# Patient Record
Sex: FEMALE | Race: WHITE | NOT HISPANIC OR LATINO | Employment: OTHER | URBAN - METROPOLITAN AREA
[De-identification: names, ages, dates, MRNs, and addresses within clinical notes are randomized per-mention and may not be internally consistent; named-entity substitution may affect disease eponyms.]

---

## 2018-07-30 ENCOUNTER — PREP FOR SURGERY (OUTPATIENT)
Dept: OTHER | Facility: HOSPITAL | Age: 78
End: 2018-07-30

## 2018-07-30 DIAGNOSIS — S82.891A CLOSED FRACTURE OF RIGHT ANKLE, INITIAL ENCOUNTER: ICD-10-CM

## 2018-07-30 DIAGNOSIS — E11.9 TYPE 2 DIABETES MELLITUS WITHOUT COMPLICATION, WITHOUT LONG-TERM CURRENT USE OF INSULIN (HCC): Primary | ICD-10-CM

## 2018-07-30 RX ORDER — CEFAZOLIN SODIUM 2 G/100ML
2 INJECTION, SOLUTION INTRAVENOUS ONCE
Status: CANCELLED | OUTPATIENT
Start: 2018-07-30 | End: 2018-07-30

## 2018-08-06 PROBLEM — S82.891A CLOSED FRACTURE OF RIGHT ANKLE: Status: ACTIVE | Noted: 2018-08-06

## 2018-08-06 PROBLEM — E11.9 TYPE 2 DIABETES MELLITUS WITHOUT COMPLICATION, WITHOUT LONG-TERM CURRENT USE OF INSULIN (HCC): Status: ACTIVE | Noted: 2018-08-06

## 2018-08-07 ENCOUNTER — OFFICE VISIT (OUTPATIENT)
Dept: ORTHOPEDIC SURGERY | Facility: CLINIC | Age: 78
End: 2018-08-07

## 2018-08-07 ENCOUNTER — APPOINTMENT (OUTPATIENT)
Dept: PREADMISSION TESTING | Facility: HOSPITAL | Age: 78
End: 2018-08-07

## 2018-08-07 VITALS — BODY MASS INDEX: 23.66 KG/M2 | HEIGHT: 65 IN | OXYGEN SATURATION: 99 % | HEART RATE: 82 BPM | WEIGHT: 142 LBS

## 2018-08-07 VITALS — BODY MASS INDEX: 23.66 KG/M2 | HEIGHT: 65 IN | WEIGHT: 142 LBS

## 2018-08-07 DIAGNOSIS — E11.9 TYPE 2 DIABETES MELLITUS WITHOUT COMPLICATION, WITHOUT LONG-TERM CURRENT USE OF INSULIN (HCC): ICD-10-CM

## 2018-08-07 DIAGNOSIS — S82.891A CLOSED FRACTURE OF RIGHT ANKLE, INITIAL ENCOUNTER: ICD-10-CM

## 2018-08-07 DIAGNOSIS — R52 PAIN: Primary | ICD-10-CM

## 2018-08-07 DIAGNOSIS — I82.5Z1 CHRONIC DEEP VEIN THROMBOSIS (DVT) OF DISTAL VEIN OF RIGHT LOWER EXTREMITY (HCC): ICD-10-CM

## 2018-08-07 LAB
ANION GAP SERPL CALCULATED.3IONS-SCNC: 9 MMOL/L (ref 3–11)
BASOPHILS # BLD AUTO: 0.01 10*3/MM3 (ref 0–0.2)
BASOPHILS NFR BLD AUTO: 0.2 % (ref 0–1)
BUN BLD-MCNC: 20 MG/DL (ref 9–23)
BUN/CREAT SERPL: 21.5 (ref 7–25)
CALCIUM SPEC-SCNC: 9.5 MG/DL (ref 8.7–10.4)
CHLORIDE SERPL-SCNC: 107 MMOL/L (ref 99–109)
CO2 SERPL-SCNC: 25 MMOL/L (ref 20–31)
CREAT BLD-MCNC: 0.93 MG/DL (ref 0.6–1.3)
DEPRECATED RDW RBC AUTO: 44.2 FL (ref 37–54)
EOSINOPHIL # BLD AUTO: 0.03 10*3/MM3 (ref 0–0.3)
EOSINOPHIL NFR BLD AUTO: 0.6 % (ref 0–3)
ERYTHROCYTE [DISTWIDTH] IN BLOOD BY AUTOMATED COUNT: 13.4 % (ref 11.3–14.5)
GFR SERPL CREATININE-BSD FRML MDRD: 58 ML/MIN/1.73
GLUCOSE BLD-MCNC: 131 MG/DL (ref 70–100)
HBA1C MFR BLD: 5.8 % (ref 4.8–5.6)
HCT VFR BLD AUTO: 34.9 % (ref 34.5–44)
HGB BLD-MCNC: 10.9 G/DL (ref 11.5–15.5)
IMM GRANULOCYTES # BLD: 0 10*3/MM3 (ref 0–0.03)
IMM GRANULOCYTES NFR BLD: 0 % (ref 0–0.6)
LYMPHOCYTES # BLD AUTO: 0.57 10*3/MM3 (ref 0.6–4.8)
LYMPHOCYTES NFR BLD AUTO: 11.7 % (ref 24–44)
MCH RBC QN AUTO: 28.5 PG (ref 27–31)
MCHC RBC AUTO-ENTMCNC: 31.2 G/DL (ref 32–36)
MCV RBC AUTO: 91.4 FL (ref 80–99)
MONOCYTES # BLD AUTO: 0.33 10*3/MM3 (ref 0–1)
MONOCYTES NFR BLD AUTO: 6.8 % (ref 0–12)
NEUTROPHILS # BLD AUTO: 3.94 10*3/MM3 (ref 1.5–8.3)
NEUTROPHILS NFR BLD AUTO: 80.7 % (ref 41–71)
PLATELET # BLD AUTO: 209 10*3/MM3 (ref 150–450)
PMV BLD AUTO: 10 FL (ref 6–12)
POTASSIUM BLD-SCNC: 4.4 MMOL/L (ref 3.5–5.5)
RBC # BLD AUTO: 3.82 10*6/MM3 (ref 3.89–5.14)
SODIUM BLD-SCNC: 141 MMOL/L (ref 132–146)
WBC NRBC COR # BLD: 4.88 10*3/MM3 (ref 3.5–10.8)

## 2018-08-07 PROCEDURE — 83036 HEMOGLOBIN GLYCOSYLATED A1C: CPT | Performed by: ORTHOPAEDIC SURGERY

## 2018-08-07 PROCEDURE — 80048 BASIC METABOLIC PNL TOTAL CA: CPT | Performed by: ORTHOPAEDIC SURGERY

## 2018-08-07 PROCEDURE — 85025 COMPLETE CBC W/AUTO DIFF WBC: CPT | Performed by: ORTHOPAEDIC SURGERY

## 2018-08-07 PROCEDURE — 36415 COLL VENOUS BLD VENIPUNCTURE: CPT

## 2018-08-07 PROCEDURE — 99204 OFFICE O/P NEW MOD 45 MIN: CPT | Performed by: ORTHOPAEDIC SURGERY

## 2018-08-07 RX ORDER — HYDROCODONE BITARTRATE AND ACETAMINOPHEN 7.5; 325 MG/1; MG/1
1-2 TABLET ORAL EVERY 6 HOURS PRN
Qty: 60 TABLET | Refills: 0 | Status: SHIPPED | OUTPATIENT
Start: 2018-08-07

## 2018-08-07 RX ORDER — ASPIRIN 325 MG
TABLET ORAL
COMMUNITY
Start: 2008-10-29

## 2018-08-07 RX ORDER — ASPIRIN 325 MG
325 TABLET ORAL DAILY
Status: ON HOLD | COMMUNITY
End: 2018-08-09

## 2018-08-07 RX ORDER — RIVAROXABAN 20 MG/1
TABLET, FILM COATED ORAL
Status: ON HOLD | COMMUNITY
End: 2018-08-09

## 2018-08-07 RX ORDER — ROSUVASTATIN CALCIUM 5 MG/1
5 TABLET, COATED ORAL DAILY
COMMUNITY

## 2018-08-07 RX ORDER — TOPIRAMATE 25 MG/1
50 CAPSULE, COATED PELLETS ORAL 2 TIMES DAILY
Status: ON HOLD | COMMUNITY
End: 2018-08-09

## 2018-08-07 RX ORDER — ROSUVASTATIN CALCIUM 5 MG/1
TABLET, COATED ORAL
Status: ON HOLD | COMMUNITY
End: 2018-08-09

## 2018-08-07 RX ORDER — TOPIRAMATE 25 MG
TABLET ORAL
COMMUNITY

## 2018-08-07 RX ORDER — CITALOPRAM 20 MG/1
TABLET ORAL
COMMUNITY

## 2018-08-07 NOTE — PAT
PATIENT HAS ABNORMAL EKG. PATIENT WAS SEEN BY DR. MOLINA'S PA 7- AND WAS FOUND TO BE STABLE ON CURRENT TREATMENT. DR. GRACE NOTIFIED. OK TO PROCEED.

## 2018-08-07 NOTE — PROGRESS NOTES
NEW PATIENT    Patient: Anaid Cao  : 1940    Primary Care Provider: Geetha Cabrera MD    Requesting Provider: As above    Pain of the Right Ankle (ORIF Rt. Ankle sx 18 )      History    Chief Complaint: right ankle fracture    History of Present Illness: this is an extremely pleasant 78 year old woman here with her sister and son-in-law.  She has a right ankle trimalleolar ankle fracture when she fell off her bed while doing something to the ceiling fan.  18 was the date.  She was seen at [facility], splinted and referred to Dr Redmond.  She is here due to a family friend recommendation.  She has been elevating, been in significant pain.  Her medical history is complex, she has diabetes (good control, most recent reported HGA1c was 6--) and lymphoma complicated by DVT.  We called Dr Cabrera's office last week to find out if her anticoagulant can be stopped, whether she needed a Lovenox bridge.  They advised that the Xarelto can be stopped 2 days pre op, no bridge needed.      No current outpatient prescriptions on file prior to visit.     No current facility-administered medications on file prior to visit.       Allergies   Allergen Reactions   • Levofloxacin Hives   • Oxycodone-Acetaminophen Itching   • Sulfa Antibiotics Hives      Past Medical History:   Diagnosis Date   • Ankle fracture    • Coronary artery disease    • Diabetes (CMS/HCC)     DIAGNOSED APPROX 5 YEARS AGO. TYPE 2 TESTS DAILY   • Lymphoma (CMS/HCC)    • Migraines      Past Surgical History:   Procedure Laterality Date   • ANKLE OPEN REDUCTION INTERNAL FIXATION Right 2018    Rt. Ankle ORIF - Dr. Ashely Saba; Orthopedic Surgery    • CORONARY ARTERY BYPASS GRAFT       Family History   Problem Relation Age of Onset   • Cancer Mother    • Diabetes Mother       Social History     Social History   • Marital status: Unknown     Spouse name: N/A   • Number of children: N/A   • Years of education: N/A     Occupational History   •  "Not on file.     Social History Main Topics   • Smoking status: Never Smoker   • Smokeless tobacco: Never Used   • Alcohol use No   • Drug use: No   • Sexual activity: Defer     Other Topics Concern   • Not on file     Social History Narrative   • No narrative on file        Review of Systems   Constitutional: Negative.    HENT: Negative.    Eyes: Negative.    Respiratory: Negative.    Cardiovascular: Positive for leg swelling.   Gastrointestinal: Negative.    Endocrine: Negative.    Genitourinary: Negative.    Musculoskeletal: Positive for arthralgias, gait problem, joint swelling and myalgias.   Skin: Positive for color change.   Allergic/Immunologic: Negative.    Hematological: Negative.    Psychiatric/Behavioral: Negative.    All other systems reviewed and are negative.      The following portions of the patient's history were reviewed and updated as appropriate: allergies, current medications, past family history, past medical history, past social history, past surgical history and problem list.    Physical Exam:   Pulse 82   Ht 165.1 cm (65\")   Wt 64.4 kg (142 lb)   SpO2 99%   BMI 23.63 kg/m²   GENERAL: Body habitus: normal weight for height    Lower extremity edema: Left: none; Right: trace    Varicose veins:  Left: mild; Right: mild    Gait: in wheelchair     Mental Status:  awake and alert; oriented to person, place, and time    Voice:  clear  SKIN:  warm and dry    Hair Growth:  Right:diminished; Left:  diminished  NAILS: Toenails: normal  HEENT: Head: Normocephalic, atraumatic,  without obvious abnormality.  eye: normal external eye, no icterus  ears: normal external ears  nose: normal external nose  pharynx: dental hygiene adequate  PULM:  Repiratory effort normal  CV:  Dorsalis Pedis:  Right: 2+; Left:2+    Posterior Tibial: Right:2+; Left:2+    Capillary Refill:  Brisk  MSK:  Hand:right handed      Tibia:  Right:  non tender; Left:  non tender      Ankle:  Right: very tender medial and lateral " ankle, mild swelling, moderate ecchymosis, no fracture bilsters, no sign of compartment syndrome.  not tender in foot except 1cm area of skin necrosis from tight ace wrap from ER, no sign of infection, not loose, no drainage,.    moderate bunion and hammertoes, some pressure from splint over bunion, but no necrosis; Left:  non tender            NEURO: Heel Walking:  Right:  unable to test; Left:  unable to test    Toe Walking:  Right:  unable to test; Left:  unable to test     Millburn-Sally 5.07 monofilament test: patchy decrease    Lower extremity sensation: diminished        Calf Atrophy:none    Motor Function: all motors fire on right, give-way weakness due to pain, no sign of compartment syndrome         Medical Decision Making    Data Review:   ordered and reviewed x-rays today, reviewed radiology results and reviewed outside records    Assessment and Plan/ Diagnosis/Treatment options:   1.. Closed fracture of right ankle, initial encounter  She has a trimalleolar ankle fracture on the right.  It was not well reduced, but at least there was no skin compromise over the fractures.  I gently tried to improve reduction in the office, and we placed a more comfortable splint, (short leg fiberglass, non-wt bearing) with more padding and no pressure on the small area of necrosis over the dorsal foot (from tight ace wrap from ER).  I had a long discussion with patient and her sister.  I explained fractures of joints to the patient.  I explained that all joint fractures will develop some arthritis. The goal of treatment is to put the joint back in  place as anatomically as possible, and hold it there to heal.  I explained that fractures have stable and unstable patterns.  I think this fracture is too unstable to hold in a cast, I think the risk of malunion and non union are too high if we use cast treatment.  (it would need to be a long leg cast)  I would recommend surgery.   I explained the surgery, the 23 hour  admission.  I explained the incisions, the plate and screws.  I would assume the hardware is permanent, rarely it is bothersome and can be removed.  I explained the possibility of syndesmotic fixation , and the possible need to remove that (I will not know if this is necessary until we are in surgery). the post op regimen is 12 weeks non wt bearing in a short leg cast then 6-8 weeks walking in a boot doing physical therapy. .  I explained the posterior malleolar fracture is not large enough to need fixation.  I explained how all ankle fractures swell for months to years, some permanently.   I explained the risks including but not limited to death, infection, stroke, heart attacks, blood clots, neurovascular damage, stiffness, pain, arthritis, hardware failure, non union, malunion, amputation, etc. I explained that she is at increased risk for all complications given her medical history (neuropathy, DM, DVT, age, lymphoma).  The research literature shows that ankle fractures in this setting have a 50% risk of complication with operative OR non-operative treatment.  However, I do not think this can be treated non-operatively, it is too unstable.  I also explained that the area of necrosis will take a long time to heal.  I also explained  That she will have some permanent swelling, may lose ROM.   Questions were asked and answered in detail. Plan for surgery Thursday, 8/9      2. Type 2 diabetes mellitus without complication, without long-term current use of insulin (CMS/HCC)  She does have neuropathy on exam    3. Chronic deep vein thrombosis (DVT) of distal vein of right lower extremity (CMS/HCC)  As above, we will stop Xarelto today, renew post op

## 2018-08-07 NOTE — DISCHARGE INSTRUCTIONS

## 2018-08-07 NOTE — PROGRESS NOTES
"        [facility] Orthopaedic Surgery Clinic Note    Subjective     Patient: Anaid Cao  : 1940    Primary Care Provider: Geetha Cabrera MD    Requesting Provider: As above    Pain of the Right Ankle (ORIF Rt. Ankle sx 18 )      History    Chief Complaint: ***    History of Present Illness: ***    No current outpatient prescriptions on file prior to visit.     No current facility-administered medications on file prior to visit.       Allergies   Allergen Reactions   • Levofloxacin Hives   • Oxycodone-Acetaminophen Itching   • Sulfa Antibiotics Hives      Past Medical History:   Diagnosis Date   • Diabetes (CMS/HCC)    • Osteoarthritis      Past Surgical History:   Procedure Laterality Date   • ANKLE OPEN REDUCTION INTERNAL FIXATION Right 2018    Rt. Ankle ORIF - Dr. Ashely Saba; Orthopedic Surgery      Family History   Problem Relation Age of Onset   • Cancer Mother    • Diabetes Mother       Social History     Social History   • Marital status: Unknown     Spouse name: N/A   • Number of children: N/A   • Years of education: N/A     Occupational History   • Not on file.     Social History Main Topics   • Smoking status: Never Smoker   • Smokeless tobacco: Never Used   • Alcohol use No   • Drug use: No   • Sexual activity: Defer     Other Topics Concern   • Not on file     Social History Narrative   • No narrative on file        Review of Systems   Constitutional: Negative.    HENT: Negative.    Eyes: Negative.    Respiratory: Negative.    Cardiovascular: Negative.    Gastrointestinal: Negative.    Endocrine: Negative.    Genitourinary: Negative.    Musculoskeletal: Positive for joint swelling.   Skin: Negative.    Allergic/Immunologic: Negative.    Neurological: Negative.    Hematological: Negative.    Psychiatric/Behavioral: Negative.        The following portions of the patient's history were reviewed and updated as appropriate: {history reviewed:::\"allergies\",\"current " "medications\",\"past family history\",\"past medical history\",\"past social history\",\"past surgical history\",\"problem list\"}.      Objective      Physical Exam  Pulse 82   Ht 165.1 cm (65\")   Wt 64.4 kg (142 lb)   SpO2 99%   BMI 23.63 kg/m²     Body mass index is 23.63 kg/m².    GENERAL: Body habitus: {body habitus:00443}    Lower extremity edema: Left: {ltdedema:70246::\"none\"}; Right: {ltdedema:31634::\"none\"}    Varicose veins:  Left: {ltdveins:82861::\"none\"}; Right: {ltdveins:88942::\"none\"}    Gait: {ltdgait:59042::\"normal\"}     Mental Status:  {mental status:61951}    Voice:  {Voice quality:05418}  SKIN:  {integument:54998::\"Normal\"}    Hair Growth:  Right:{hair growth:99728::\"normal\"}; Left:  {hair growth:04574::\"normal\"}  HEENT: {Exam; heent:93339}   PULM:  {pulm:87616::\"Repiratory effort normal\"}    Ortho Exam  ***    Medical Decision Making    Data Review:   {Data Review:26992}    Assessment:  1. Pain        Plan:  ***      Jennifer Arteaga MA  08/07/18  10:32 AM  "

## 2018-08-08 RX ORDER — HYDROCODONE BITARTRATE AND ACETAMINOPHEN 7.5; 325 MG/1; MG/1
1-2 TABLET ORAL EVERY 6 HOURS PRN
Qty: 60 TABLET | Refills: 0 | Status: ON HOLD | OUTPATIENT
Start: 2018-08-08 | End: 2018-08-09

## 2018-08-08 RX ORDER — ONDANSETRON 4 MG/1
4 TABLET, FILM COATED ORAL EVERY 6 HOURS PRN
Qty: 30 TABLET | Refills: 0 | Status: SHIPPED | OUTPATIENT
Start: 2018-08-08

## 2018-08-09 ENCOUNTER — HOSPITAL ENCOUNTER (OUTPATIENT)
Facility: HOSPITAL | Age: 78
Discharge: HOME | End: 2018-08-10
Attending: ORTHOPAEDIC SURGERY | Admitting: ANESTHESIOLOGY

## 2018-08-09 ENCOUNTER — APPOINTMENT (OUTPATIENT)
Dept: GENERAL RADIOLOGY | Facility: HOSPITAL | Age: 78
End: 2018-08-09

## 2018-08-09 ENCOUNTER — ANESTHESIA (OUTPATIENT)
Dept: PERIOP | Facility: HOSPITAL | Age: 78
End: 2018-08-09

## 2018-08-09 ENCOUNTER — ANESTHESIA EVENT (OUTPATIENT)
Dept: PERIOP | Facility: HOSPITAL | Age: 78
End: 2018-08-09

## 2018-08-09 DIAGNOSIS — Z74.09 IMPAIRED FUNCTIONAL MOBILITY, BALANCE, GAIT, AND ENDURANCE: Primary | ICD-10-CM

## 2018-08-09 DIAGNOSIS — E11.9 TYPE 2 DIABETES MELLITUS WITHOUT COMPLICATION, WITHOUT LONG-TERM CURRENT USE OF INSULIN (HCC): ICD-10-CM

## 2018-08-09 DIAGNOSIS — S82.891A CLOSED FRACTURE OF RIGHT ANKLE, INITIAL ENCOUNTER: ICD-10-CM

## 2018-08-09 PROBLEM — Z87.81 S/P ORIF (OPEN REDUCTION INTERNAL FIXATION) FRACTURE: Status: ACTIVE | Noted: 2018-08-09

## 2018-08-09 PROBLEM — Z98.890 S/P ORIF (OPEN REDUCTION INTERNAL FIXATION) FRACTURE: Status: ACTIVE | Noted: 2018-08-09

## 2018-08-09 PROBLEM — I25.10 CAD (CORONARY ARTERY DISEASE): Status: ACTIVE | Noted: 2018-08-09

## 2018-08-09 PROBLEM — S82.841A ANKLE FRACTURE, BIMALLEOLAR, CLOSED, RIGHT, INITIAL ENCOUNTER: Status: ACTIVE | Noted: 2018-08-09

## 2018-08-09 PROBLEM — E78.5 HLD (HYPERLIPIDEMIA): Status: ACTIVE | Noted: 2018-08-09

## 2018-08-09 LAB
GLUCOSE BLDC GLUCOMTR-MCNC: 127 MG/DL (ref 70–130)
GLUCOSE BLDC GLUCOMTR-MCNC: 148 MG/DL (ref 70–130)
GLUCOSE BLDC GLUCOMTR-MCNC: 190 MG/DL (ref 70–130)

## 2018-08-09 PROCEDURE — 25010000003 CEFAZOLIN IN DEXTROSE 2-4 GM/100ML-% SOLUTION: Performed by: ORTHOPAEDIC SURGERY

## 2018-08-09 PROCEDURE — 25010000002 DEXAMETHASONE PER 1 MG: Performed by: ANESTHESIOLOGY

## 2018-08-09 PROCEDURE — 25010000002 ONDANSETRON PER 1 MG: Performed by: ORTHOPAEDIC SURGERY

## 2018-08-09 PROCEDURE — 25010000002 ROPIVACAINE HCL-NACL 0.2-0.9 % SOLUTION: Performed by: ANESTHESIOLOGY

## 2018-08-09 PROCEDURE — 25010000002 FENTANYL CITRATE (PF) 100 MCG/2ML SOLUTION: Performed by: ANESTHESIOLOGY

## 2018-08-09 PROCEDURE — 25010000002 MIDAZOLAM PER 1 MG: Performed by: ANESTHESIOLOGY

## 2018-08-09 PROCEDURE — 25010000002 DEXAMETHASONE PER 1 MG: Performed by: NURSE ANESTHETIST, CERTIFIED REGISTERED

## 2018-08-09 PROCEDURE — C1713 ANCHOR/SCREW BN/BN,TIS/BN: HCPCS | Performed by: ORTHOPAEDIC SURGERY

## 2018-08-09 PROCEDURE — 97161 PT EVAL LOW COMPLEX 20 MIN: CPT

## 2018-08-09 PROCEDURE — 27829 TREAT LOWER LEG JOINT: CPT | Performed by: ORTHOPAEDIC SURGERY

## 2018-08-09 PROCEDURE — 63710000001 INSULIN LISPRO (HUMAN) PER 5 UNITS: Performed by: NURSE PRACTITIONER

## 2018-08-09 PROCEDURE — 76000 FLUOROSCOPY <1 HR PHYS/QHP: CPT

## 2018-08-09 PROCEDURE — 25010000002 PROPOFOL 10 MG/ML EMULSION: Performed by: NURSE ANESTHETIST, CERTIFIED REGISTERED

## 2018-08-09 PROCEDURE — 25010000002 BUPRENORPHINE PER 0.1 MG: Performed by: ANESTHESIOLOGY

## 2018-08-09 PROCEDURE — 25010000002 FENTANYL CITRATE (PF) 100 MCG/2ML SOLUTION: Performed by: NURSE ANESTHETIST, CERTIFIED REGISTERED

## 2018-08-09 PROCEDURE — 97530 THERAPEUTIC ACTIVITIES: CPT

## 2018-08-09 PROCEDURE — 25810000003 POTASSIUM CHLORIDE PER 2 MEQ: Performed by: ORTHOPAEDIC SURGERY

## 2018-08-09 PROCEDURE — 82962 GLUCOSE BLOOD TEST: CPT

## 2018-08-09 PROCEDURE — C1769 GUIDE WIRE: HCPCS | Performed by: ORTHOPAEDIC SURGERY

## 2018-08-09 PROCEDURE — 27822 TREATMENT OF ANKLE FRACTURE: CPT | Performed by: ORTHOPAEDIC SURGERY

## 2018-08-09 DEVICE — SCRW CORT S/TAP 3.5X18MM: Type: IMPLANTABLE DEVICE | Site: ANKLE | Status: FUNCTIONAL

## 2018-08-09 DEVICE — SCRW CANN SHRT THRD 1/3 4X46MM: Type: IMPLANTABLE DEVICE | Site: ANKLE | Status: FUNCTIONAL

## 2018-08-09 DEVICE — PLT TBG 1/3 LCP W COL 7HL 81MM: Type: IMPLANTABLE DEVICE | Site: ANKLE | Status: FUNCTIONAL

## 2018-08-09 DEVICE — SCRW CANN SHRT THRD 1/3 4X42MM: Type: IMPLANTABLE DEVICE | Site: ANKLE | Status: FUNCTIONAL

## 2018-08-09 DEVICE — SCRW CORT S/TAP 3.5X44MM: Type: IMPLANTABLE DEVICE | Site: ANKLE | Status: FUNCTIONAL

## 2018-08-09 DEVICE — SCRW CANC FUL/THRD 4.0X16MM: Type: IMPLANTABLE DEVICE | Site: ANKLE | Status: FUNCTIONAL

## 2018-08-09 DEVICE — SCRW CANC FUL/THRD 4.0X14MM: Type: IMPLANTABLE DEVICE | Site: ANKLE | Status: FUNCTIONAL

## 2018-08-09 DEVICE — SCRW CORT S/TAP STRDRV 2.4X14MM: Type: IMPLANTABLE DEVICE | Site: ANKLE | Status: FUNCTIONAL

## 2018-08-09 DEVICE — SCRW CORT S/TAP 3.5X14MM: Type: IMPLANTABLE DEVICE | Site: ANKLE | Status: FUNCTIONAL

## 2018-08-09 RX ORDER — LIDOCAINE HYDROCHLORIDE 10 MG/ML
INJECTION, SOLUTION EPIDURAL; INFILTRATION; INTRACAUDAL; PERINEURAL AS NEEDED
Status: DISCONTINUED | OUTPATIENT
Start: 2018-08-09 | End: 2018-08-09 | Stop reason: SURG

## 2018-08-09 RX ORDER — DEXAMETHASONE SODIUM PHOSPHATE 10 MG/ML
INJECTION INTRAMUSCULAR; INTRAVENOUS AS NEEDED
Status: DISCONTINUED | OUTPATIENT
Start: 2018-08-09 | End: 2018-08-09 | Stop reason: SURG

## 2018-08-09 RX ORDER — FENTANYL CITRATE 50 UG/ML
50 INJECTION, SOLUTION INTRAMUSCULAR; INTRAVENOUS
Status: DISCONTINUED | OUTPATIENT
Start: 2018-08-09 | End: 2018-08-09 | Stop reason: HOSPADM

## 2018-08-09 RX ORDER — HYDROCODONE BITARTRATE AND ACETAMINOPHEN 7.5; 325 MG/1; MG/1
1 TABLET ORAL EVERY 4 HOURS PRN
Status: DISCONTINUED | OUTPATIENT
Start: 2018-08-09 | End: 2018-08-10 | Stop reason: HOSPADM

## 2018-08-09 RX ORDER — DIPHENHYDRAMINE HYDROCHLORIDE 50 MG/ML
25 INJECTION INTRAMUSCULAR; INTRAVENOUS NIGHTLY PRN
Status: DISCONTINUED | OUTPATIENT
Start: 2018-08-09 | End: 2018-08-10 | Stop reason: HOSPADM

## 2018-08-09 RX ORDER — ROPIVACAINE IN 0.9% SOD CHL/PF 0.2% 545ML
6 ELASTOMERIC PUMP, HI VARIABLE RATE INJECTION CONTINUOUS
Status: DISCONTINUED | OUTPATIENT
Start: 2018-08-09 | End: 2018-08-10 | Stop reason: HOSPADM

## 2018-08-09 RX ORDER — LABETALOL HYDROCHLORIDE 5 MG/ML
5 INJECTION, SOLUTION INTRAVENOUS
Status: DISCONTINUED | OUTPATIENT
Start: 2018-08-09 | End: 2018-08-09 | Stop reason: HOSPADM

## 2018-08-09 RX ORDER — BUPRENORPHINE HYDROCHLORIDE 0.32 MG/ML
INJECTION INTRAMUSCULAR; INTRAVENOUS AS NEEDED
Status: DISCONTINUED | OUTPATIENT
Start: 2018-08-09 | End: 2018-08-09 | Stop reason: SURG

## 2018-08-09 RX ORDER — ROSUVASTATIN CALCIUM 10 MG/1
5 TABLET, COATED ORAL DAILY
Status: DISCONTINUED | OUTPATIENT
Start: 2018-08-09 | End: 2018-08-10

## 2018-08-09 RX ORDER — NICOTINE POLACRILEX 4 MG
15 LOZENGE BUCCAL
Status: DISCONTINUED | OUTPATIENT
Start: 2018-08-09 | End: 2018-08-10 | Stop reason: HOSPADM

## 2018-08-09 RX ORDER — DIPHENHYDRAMINE HCL 25 MG
25 CAPSULE ORAL NIGHTLY PRN
Status: DISCONTINUED | OUTPATIENT
Start: 2018-08-09 | End: 2018-08-10 | Stop reason: HOSPADM

## 2018-08-09 RX ORDER — NALOXONE HCL 0.4 MG/ML
0.4 VIAL (ML) INJECTION
Status: DISCONTINUED | OUTPATIENT
Start: 2018-08-09 | End: 2018-08-10 | Stop reason: HOSPADM

## 2018-08-09 RX ORDER — ONDANSETRON 2 MG/ML
4 INJECTION INTRAMUSCULAR; INTRAVENOUS ONCE AS NEEDED
Status: DISCONTINUED | OUTPATIENT
Start: 2018-08-09 | End: 2018-08-09 | Stop reason: HOSPADM

## 2018-08-09 RX ORDER — LABETALOL HYDROCHLORIDE 5 MG/ML
10 INJECTION, SOLUTION INTRAVENOUS EVERY 4 HOURS PRN
Status: DISCONTINUED | OUTPATIENT
Start: 2018-08-09 | End: 2018-08-10 | Stop reason: HOSPADM

## 2018-08-09 RX ORDER — FENTANYL CITRATE 50 UG/ML
INJECTION, SOLUTION INTRAMUSCULAR; INTRAVENOUS AS NEEDED
Status: DISCONTINUED | OUTPATIENT
Start: 2018-08-09 | End: 2018-08-09 | Stop reason: SURG

## 2018-08-09 RX ORDER — LIDOCAINE HYDROCHLORIDE 10 MG/ML
0.5 INJECTION, SOLUTION EPIDURAL; INFILTRATION; INTRACAUDAL; PERINEURAL ONCE AS NEEDED
Status: COMPLETED | OUTPATIENT
Start: 2018-08-09 | End: 2018-08-09

## 2018-08-09 RX ORDER — MIDAZOLAM HYDROCHLORIDE 1 MG/ML
INJECTION INTRAMUSCULAR; INTRAVENOUS AS NEEDED
Status: DISCONTINUED | OUTPATIENT
Start: 2018-08-09 | End: 2018-08-09 | Stop reason: SURG

## 2018-08-09 RX ORDER — TOPIRAMATE 25 MG/1
50 TABLET ORAL NIGHTLY
Status: DISCONTINUED | OUTPATIENT
Start: 2018-08-09 | End: 2018-08-09

## 2018-08-09 RX ORDER — BUPIVACAINE HYDROCHLORIDE 2.5 MG/ML
INJECTION, SOLUTION EPIDURAL; INFILTRATION; INTRACAUDAL AS NEEDED
Status: DISCONTINUED | OUTPATIENT
Start: 2018-08-09 | End: 2018-08-09 | Stop reason: SURG

## 2018-08-09 RX ORDER — ONDANSETRON 2 MG/ML
4 INJECTION INTRAMUSCULAR; INTRAVENOUS EVERY 6 HOURS PRN
Status: DISCONTINUED | OUTPATIENT
Start: 2018-08-09 | End: 2018-08-10 | Stop reason: HOSPADM

## 2018-08-09 RX ORDER — EPHEDRINE SULFATE 50 MG/ML
5 INJECTION, SOLUTION INTRAVENOUS ONCE AS NEEDED
Status: DISCONTINUED | OUTPATIENT
Start: 2018-08-09 | End: 2018-08-09 | Stop reason: HOSPADM

## 2018-08-09 RX ORDER — ACETAMINOPHEN 325 MG/1
325 TABLET ORAL EVERY 4 HOURS PRN
Status: DISCONTINUED | OUTPATIENT
Start: 2018-08-09 | End: 2018-08-10 | Stop reason: HOSPADM

## 2018-08-09 RX ORDER — FAMOTIDINE 20 MG/1
20 TABLET, FILM COATED ORAL
Status: DISCONTINUED | OUTPATIENT
Start: 2018-08-09 | End: 2018-08-09 | Stop reason: HOSPADM

## 2018-08-09 RX ORDER — CEFAZOLIN SODIUM 2 G/100ML
2 INJECTION, SOLUTION INTRAVENOUS ONCE
Status: COMPLETED | OUTPATIENT
Start: 2018-08-09 | End: 2018-08-09

## 2018-08-09 RX ORDER — PROPOFOL 10 MG/ML
VIAL (ML) INTRAVENOUS AS NEEDED
Status: DISCONTINUED | OUTPATIENT
Start: 2018-08-09 | End: 2018-08-09 | Stop reason: SURG

## 2018-08-09 RX ORDER — SODIUM CHLORIDE 0.9 % (FLUSH) 0.9 %
1-10 SYRINGE (ML) INJECTION AS NEEDED
Status: DISCONTINUED | OUTPATIENT
Start: 2018-08-09 | End: 2018-08-09 | Stop reason: HOSPADM

## 2018-08-09 RX ORDER — SODIUM CHLORIDE, SODIUM LACTATE, POTASSIUM CHLORIDE, CALCIUM CHLORIDE 600; 310; 30; 20 MG/100ML; MG/100ML; MG/100ML; MG/100ML
9 INJECTION, SOLUTION INTRAVENOUS CONTINUOUS PRN
Status: DISCONTINUED | OUTPATIENT
Start: 2018-08-09 | End: 2018-08-09 | Stop reason: HOSPADM

## 2018-08-09 RX ORDER — NALOXONE HCL 0.4 MG/ML
0.4 VIAL (ML) INJECTION AS NEEDED
Status: DISCONTINUED | OUTPATIENT
Start: 2018-08-09 | End: 2018-08-09 | Stop reason: HOSPADM

## 2018-08-09 RX ORDER — CEFAZOLIN SODIUM 2 G/100ML
2 INJECTION, SOLUTION INTRAVENOUS EVERY 8 HOURS
Status: COMPLETED | OUTPATIENT
Start: 2018-08-09 | End: 2018-08-10

## 2018-08-09 RX ORDER — DIPHENOXYLATE HYDROCHLORIDE AND ATROPINE SULFATE 2.5; .025 MG/1; MG/1
1 TABLET ORAL DAILY
Status: DISCONTINUED | OUTPATIENT
Start: 2018-08-09 | End: 2018-08-10 | Stop reason: HOSPADM

## 2018-08-09 RX ORDER — DIPHENHYDRAMINE HCL 25 MG
25 CAPSULE ORAL EVERY 6 HOURS PRN
Status: DISCONTINUED | OUTPATIENT
Start: 2018-08-09 | End: 2018-08-10 | Stop reason: HOSPADM

## 2018-08-09 RX ORDER — DEXAMETHASONE SODIUM PHOSPHATE 4 MG/ML
INJECTION, SOLUTION INTRA-ARTICULAR; INTRALESIONAL; INTRAMUSCULAR; INTRAVENOUS; SOFT TISSUE AS NEEDED
Status: DISCONTINUED | OUTPATIENT
Start: 2018-08-09 | End: 2018-08-09 | Stop reason: SURG

## 2018-08-09 RX ORDER — BISACODYL 10 MG
10 SUPPOSITORY, RECTAL RECTAL DAILY PRN
Status: DISCONTINUED | OUTPATIENT
Start: 2018-08-09 | End: 2018-08-10 | Stop reason: HOSPADM

## 2018-08-09 RX ORDER — PROMETHAZINE HYDROCHLORIDE 25 MG/ML
12.5 INJECTION, SOLUTION INTRAMUSCULAR; INTRAVENOUS EVERY 4 HOURS PRN
Status: DISCONTINUED | OUTPATIENT
Start: 2018-08-09 | End: 2018-08-10 | Stop reason: HOSPADM

## 2018-08-09 RX ORDER — DEXTROSE MONOHYDRATE 25 G/50ML
25 INJECTION, SOLUTION INTRAVENOUS
Status: DISCONTINUED | OUTPATIENT
Start: 2018-08-09 | End: 2018-08-10 | Stop reason: HOSPADM

## 2018-08-09 RX ORDER — MUPIROCIN CALCIUM 20 MG/G
CREAM TOPICAL AS NEEDED
Status: DISCONTINUED | OUTPATIENT
Start: 2018-08-09 | End: 2018-08-09 | Stop reason: HOSPADM

## 2018-08-09 RX ORDER — SODIUM CHLORIDE, SODIUM LACTATE, POTASSIUM CHLORIDE, CALCIUM CHLORIDE 600; 310; 30; 20 MG/100ML; MG/100ML; MG/100ML; MG/100ML
100 INJECTION, SOLUTION INTRAVENOUS CONTINUOUS
Status: DISCONTINUED | OUTPATIENT
Start: 2018-08-09 | End: 2018-08-10 | Stop reason: HOSPADM

## 2018-08-09 RX ORDER — MEPERIDINE HYDROCHLORIDE 25 MG/ML
12.5 INJECTION INTRAMUSCULAR; INTRAVENOUS; SUBCUTANEOUS
Status: DISCONTINUED | OUTPATIENT
Start: 2018-08-09 | End: 2018-08-09 | Stop reason: HOSPADM

## 2018-08-09 RX ORDER — TOPIRAMATE 25 MG/1
50 TABLET ORAL NIGHTLY
Status: DISCONTINUED | OUTPATIENT
Start: 2018-08-10 | End: 2018-08-10 | Stop reason: HOSPADM

## 2018-08-09 RX ORDER — HYDROCODONE BITARTRATE AND ACETAMINOPHEN 7.5; 325 MG/1; MG/1
2 TABLET ORAL EVERY 4 HOURS PRN
Status: DISCONTINUED | OUTPATIENT
Start: 2018-08-09 | End: 2018-08-10 | Stop reason: HOSPADM

## 2018-08-09 RX ORDER — ONDANSETRON 4 MG/1
4 TABLET, FILM COATED ORAL EVERY 6 HOURS PRN
Status: DISCONTINUED | OUTPATIENT
Start: 2018-08-09 | End: 2018-08-10 | Stop reason: HOSPADM

## 2018-08-09 RX ORDER — SODIUM CHLORIDE AND POTASSIUM CHLORIDE 150; 450 MG/100ML; MG/100ML
75 INJECTION, SOLUTION INTRAVENOUS CONTINUOUS
Status: DISCONTINUED | OUTPATIENT
Start: 2018-08-09 | End: 2018-08-10 | Stop reason: HOSPADM

## 2018-08-09 RX ORDER — CITALOPRAM 20 MG/1
20 TABLET ORAL DAILY
Status: DISCONTINUED | OUTPATIENT
Start: 2018-08-09 | End: 2018-08-10

## 2018-08-09 RX ADMIN — FENTANYL CITRATE 100 MCG: 50 INJECTION, SOLUTION INTRAMUSCULAR; INTRAVENOUS at 09:40

## 2018-08-09 RX ADMIN — POTASSIUM CHLORIDE AND SODIUM CHLORIDE 75 ML/HR: 450; 150 INJECTION, SOLUTION INTRAVENOUS at 15:07

## 2018-08-09 RX ADMIN — MIDAZOLAM HYDROCHLORIDE 2 MG: 1 INJECTION, SOLUTION INTRAMUSCULAR; INTRAVENOUS at 09:40

## 2018-08-09 RX ADMIN — FENTANYL CITRATE 50 MCG: 50 INJECTION INTRAMUSCULAR; INTRAVENOUS at 13:40

## 2018-08-09 RX ADMIN — Medication 6 ML: at 13:12

## 2018-08-09 RX ADMIN — BUPIVACAINE HYDROCHLORIDE 30 ML: 2.5 INJECTION, SOLUTION EPIDURAL; INFILTRATION; INTRACAUDAL; PERINEURAL at 09:45

## 2018-08-09 RX ADMIN — FAMOTIDINE 20 MG: 20 TABLET ORAL at 08:50

## 2018-08-09 RX ADMIN — ROSUVASTATIN CALCIUM 5 MG: 10 TABLET, FILM COATED ORAL at 21:00

## 2018-08-09 RX ADMIN — LIDOCAINE HYDROCHLORIDE 0.5 ML: 10 INJECTION, SOLUTION EPIDURAL; INFILTRATION; INTRACAUDAL; PERINEURAL at 08:50

## 2018-08-09 RX ADMIN — CEFAZOLIN SODIUM 2 G: 2 INJECTION, SOLUTION INTRAVENOUS at 20:59

## 2018-08-09 RX ADMIN — INSULIN LISPRO 2 UNITS: 100 INJECTION, SOLUTION INTRAVENOUS; SUBCUTANEOUS at 17:46

## 2018-08-09 RX ADMIN — SODIUM CHLORIDE, POTASSIUM CHLORIDE, SODIUM LACTATE AND CALCIUM CHLORIDE: 600; 310; 30; 20 INJECTION, SOLUTION INTRAVENOUS at 11:21

## 2018-08-09 RX ADMIN — DEXAMETHASONE SODIUM PHOSPHATE 4 MG: 4 INJECTION, SOLUTION INTRAMUSCULAR; INTRAVENOUS at 11:25

## 2018-08-09 RX ADMIN — ONDANSETRON 4 MG: 2 INJECTION INTRAMUSCULAR; INTRAVENOUS at 15:25

## 2018-08-09 RX ADMIN — BUPRENORPHINE HYDROCHLORIDE 300 MCG: 0.32 INJECTION INTRAMUSCULAR; INTRAVENOUS at 09:45

## 2018-08-09 RX ADMIN — DEXAMETHASONE SODIUM PHOSPHATE 2 MG: 10 INJECTION INTRAMUSCULAR; INTRAVENOUS at 09:45

## 2018-08-09 RX ADMIN — LIDOCAINE HYDROCHLORIDE 40 MG: 10 INJECTION, SOLUTION EPIDURAL; INFILTRATION; INTRACAUDAL; PERINEURAL at 11:25

## 2018-08-09 RX ADMIN — PROPOFOL 100 MG: 10 INJECTION, EMULSION INTRAVENOUS at 11:25

## 2018-08-09 RX ADMIN — CITALOPRAM 20 MG: 20 TABLET, FILM COATED ORAL at 20:59

## 2018-08-09 RX ADMIN — CEFAZOLIN SODIUM 2 G: 2 INJECTION, SOLUTION INTRAVENOUS at 11:21

## 2018-08-09 NOTE — PLAN OF CARE
Problem: Patient Care Overview  Goal: Plan of Care Review  Outcome: Ongoing (interventions implemented as appropriate)   08/09/18 1473   Plan of Care Review   Progress improving   OTHER   Outcome Summary Patient declined initiating gait training with rolling knee walker until AM. Patient able to hop and t/f from bed to BSC and from BSC to chair, requiring only CGA for safety. Plan is d/c home with family. Will continue to progress mobility training as able.    Coping/Psychosocial   Plan of Care Reviewed With patient;daughter

## 2018-08-09 NOTE — OP NOTE
Operative Report    08/09/18  1:19 PM    Preoperative diagnosis: right trimalleolar ankle fracture with syndesmotic disruption    Postoperative diagnosis: Same    Anesthesia: Gen. with blocks for postop pain control    Surgeon: Ashely Jolly M.D.    Assistant: soraya MOSS, she was present for the entire procedure including prepping, draping, retraction, closure, dressing.    Operative procedure: 1. ORIF right trimalleolar ankle fracture without fixation of posterior lip  2. ORIF syndesmotic disruption    Operative indications: This is an extremely pleasant 78-year-old woman with a comminuted, unstable right trimalleolar ankle fracture dislocation.  I found she had an unstable syndesmosis at the time of injury.  She has well-controlled diabetes, but she does have neuropathy.  She has an existing area of skin necrosis about the size of a nickel on the dorsum of the foot and the medial first metatarsal head that were caused by the initial splint from the emergency room.  They are both stable, there not loose, there is no signs of infection.  She does not have any fracture blisters, her swelling is under control.  We discussed operative and nonoperative treatment.  This is a highly unstable fracture and I felt cast treatment would result in a very poor outcome.  We also discussed the fact that with operative and non-operative treatment there is a 50% risk of complications given all of the medical problems in this setting.  At the time of surgery I was able to get very good fixation of the fractures in the syndesmosis.    Operative procedure: The patient was taken to the operating room where general anesthesia was induced without difficulty.  She was given preoperative blocks and preoperative antibiotics.  The right leg was carefully prepped and draped in the usual sterile fashion.  The fracture was quite unstable and care was taken to hold it in good alignment.  A well-padded tourniquet was placed on the thigh.   The appropriate timeout was called.  The leg was elevated, wrapped with an Esmarch, and inflated to 350 mmHg.  Tourniquet time was 90 minutes.  I made a 10 cm lateral incision over the fibula and carried this down through soft tissue bluntly.  Care was taken to protect neurovascular structures and keep full-thickness flaps.  The fibula was dissected subperiosteally.  It was moderately comminuted.  There was an interesting anterior longitudinal comminution on the distal fibula, it was about 1-1/2 cm in length, it was about 1 cm in width, it.  The syndesmotic ligament.  It was a very thin piece however and I was only able to put one 2.4 mm screw in it.  The fracture was irrigated and hematoma removed.  The fractures were reduced anatomically and held with a bone-holding clamp.  I took an 8 hole Synthes one third tubular plate and contoured it.  I first placed an anterior to posterior lag screw across the oblique largest portion of the fibula fracture.  It was a little long and I changed it for a shorter screw.  I then applied the plate and in standard fashion filled the screw holes as needed.  I then used a 2.4 mm screw to place an anterior to posterior screw in the long comminuted piece on the distal fibula.  This piece held the syndesmotic ligament and it was reduced anatomically.  The piece was too fragile and comminuted to put more than 1 screw in.  C-arm in multiple planes showed anatomic alignment.  I then made a 6 cm incision medially over the medial malleolus and carried this down through soft tissue bluntly.  Medial malleolus was more comminuted.  There were thin  flakes of bone that had been pulled up by the periosteum.  These were replaced at the time of closure.  They were left attached to the pieces of periosteum.  The fracture was irrigated and hematoma removed.  The talus was visible there was some cartilage scuffing.  The fracture was reduced anatomically and the guidewires for the 4.0 cannulated  screws were placed.  They were adjusted as needed under C-arm guidance.  The appropriate length screws were placed.  C-arm in multiple planes including AP, mortise, lateral showed anatomic alignment and appropriate position and length of the hardware.  I then tested the syndesmosis.  I found that it was unstable.  I felt that the one small screw in the fragment containing the syndesmotic ligament would not be sufficient to allow the syndesmosis to heal in a stable fashion.  I therefore placed a screw from the fibular plate into the tibia to stabilize the syndesmosis.  C-arm again in multiple planes showed anatomic alignment and appropriate position and length of the hardware.  The incisions were irrigated copiously with antibiotic solution.  They were closed in layers with Monocryl and nylon.  The incisions and the areas of skin necrosis were dressed with Bactroban and Telfa.  Everything was dressed sterilely.  The patient was then placed in a 3 sided splint with the ankle in neutral.  It should be noted that the syndesmosis was fixed with the ankle in neutral.  Tourniquet was released.  She was awakened, extubated and transferred to recovery room in stable condition.  Postop plan will be admission for elevation and pain control.    Estimated blood loss: 15 cc    Specimens: None    Drains: None    Complications: None    Ashely Saba MD  08/09/18  1:19 PM

## 2018-08-09 NOTE — ANESTHESIA PROCEDURE NOTES
Airway  Urgency: elective    Airway not difficult    General Information and Staff    Patient location during procedure: OR  Anesthesiologist: DAVID PINZON  CRNA: DAVID KAPADIA    Indications and Patient Condition  Indications for airway management: airway protection    Preoxygenated: yes  Mask difficulty assessment: 1 - vent by mask    Final Airway Details  Final airway type: supraglottic airway      Successful airway: I-gel  Size 4    Number of attempts at approach: 1    Additional Comments  LMA placed without difficulty, ventilation with assist, equal breath sounds and symmetric chest rise and fall

## 2018-08-09 NOTE — PLAN OF CARE
Problem: Patient Care Overview  Goal: Plan of Care Review  Outcome: Ongoing (interventions implemented as appropriate)   08/09/18 1702   Plan of Care Review   Progress improving   OTHER   Outcome Summary Pt A&Ox4, pleasant. Right ankle splinted CDI, wiggles toes, can feel when touched. On-q going at 6ml/hr into right popliteal. Will continue to monitor.   Coping/Psychosocial   Plan of Care Reviewed With patient

## 2018-08-09 NOTE — BRIEF OP NOTE
Orthopedics ORIF right ankle fracture, syndesmosis repair  Brief Op Note    Anaid Cao  8/9/2018    Pre-op Diagnosis:   Closed fracture of right ankle, initial encounter [S82.891A]  Type 2 diabetes mellitus without complication, without long-term current use of insulin (CMS/[facility]) [E11.9] trimalleolar ankle fracture with syndesmotic disruption    Post-op Diagnosis:  same       Post-Op Diagnosis Codes:     * Closed fracture of right ankle, initial encounter [S82.891A]     * Type 2 diabetes mellitus without complication, without long-term current use of insulin (CMS/[facility]) [E11.9]    Procedure(s):  ORIF right ankle fracture, syndesmosis repair    Surgeon(s):  Ashely Saba MD    Anesthesia:  General    Staff:   Circulator: Lisa Harmon RN; Shantell Lopes RN  Scrub Person: Vane Keita; Isaiah Huitron; Rhett Borja  Vendor Representative: Luke Quintero  Nursing Assistant: Christian Valerio  Assistant: Carmen Cuevas PA-C    Estimated Blood Loss:15cc      Specimens: none      Drains:  none    Complications:  None    Tourniquet:: 90min    Dressing:splint    Disposition:rr stable    Ashely Saba MD     Date: 8/9/2018  Time: 1:18 PM

## 2018-08-09 NOTE — ANESTHESIA PREPROCEDURE EVALUATION
Anesthesia Evaluation     Patient summary reviewed and Nursing notes reviewed   history of anesthetic complications: PONV  NPO Solid Status: > 8 hours  NPO Liquid Status: > 2 hours           Airway   Mallampati: II  TM distance: >3 FB  Neck ROM: full  Possible difficult intubation  Dental    (+) upper dentures and lower dentures    Pulmonary    (+) decreased breath sounds,   Cardiovascular   Exercise tolerance: good (4-7 METS)    ECG reviewed  Rhythm: regular  Rate: normal    (+) CAD, CABG > 6 Months, PVD, DVT resolved,       Neuro/Psych  (+) headaches,     GI/Hepatic/Renal/Endo    (+)   diabetes mellitus type 2 well controlled,     Musculoskeletal     Abdominal   (-) obese    Abdomen: soft.   Substance History      OB/GYN          Other   (+) blood dyscrasia   history of cancer remission    ROS/Med Hx Other: MILD ANEMIA                  Anesthesia Plan    ASA 3     general and regional     intravenous induction   Anesthetic plan and risks discussed with patient.    Plan discussed with CRNA.

## 2018-08-09 NOTE — ANESTHESIA PROCEDURE NOTES
Peripheral Block    Patient location during procedure: pre-op  Start time: 8/9/2018 9:40 AM  Stop time: 8/9/2018 9:48 AM  Reason for block: at surgeon's request and post-op pain management  Performed by  Anesthesiologist: DAVID PINZON  Preanesthetic Checklist  Completed: patient identified, site marked, surgical consent, pre-op evaluation, timeout performed, IV checked, risks and benefits discussed and monitors and equipment checked  Prep:  Sterile barriers:cap, gloves, mask and sterile barriers  Prep: ChloraPrep  Patient monitoring: blood pressure monitoring, continuous pulse oximetry and EKG  Procedure  Sedation:yes    Guidance:ultrasound guided  Images:still images obtained    Laterality:right  Block Type:popliteal  Injection Technique:catheter  Needle Type:echogenic  Needle Gauge:18 G    Catheter Size:20 G  Medications  Local Injected:bupivacaine 0.25% Local Amount Injected:20 (mls)mL  Post Assessment  Injection Assessment: negative aspiration for heme, no paresthesia on injection and incremental injection  Patient Tolerance:comfortable throughout block  Complications:no  Additional Notes  Procedure:                                                         The pt was placed in  lateral position.  The Insertion site was  prepped and Draped in sterile fashion.  The pt was anesthetized with  IV Sedation( see meds).  Skin and cutaneous tissue was infiltrated and anesthetized with 1% Lidocaine 3 mls via a 25g needle.  A BBraun 4 inch 18g echogenic needle was then  inserted approximately 3 cm proximal to the popliteal melisa a at the lateral mid biceps femoris and advanced In-plane with Ultrasound guidance.  Normal Saline PSF was utilized for hydrodissection of tissue.  The popliteal artery was visualized and the common peroneal and tibial bifurcation was located.  LA injection spread was visualized, injection was incremental 1-5ml, injection pressure was normal or little, no intraneural injection, no vascular  injection.  .  A BBraun 20g wire stylet catheter was placed via the needle with ultrasound visualization and confirmation with NS fluid bolus. The labeled Catheter was then secured at insertions site with skin afix,  mastisol, steristrips  and a CHG transparent dressing was placed over. Thank you

## 2018-08-09 NOTE — H&P (VIEW-ONLY)
NEW PATIENT    Patient: Anaid Cao  : 1940    Primary Care Provider: Geetha Cabrera MD    Requesting Provider: As above    Pain of the Right Ankle (ORIF Rt. Ankle sx 18 )      History    Chief Complaint: right ankle fracture    History of Present Illness: this is an extremely pleasant 78 year old woman here with her sister and son-in-law.  She has a right ankle trimalleolar ankle fracture when she fell off her bed while doing something to the ceiling fan.  18 was the date.  She was seen at [facility], splinted and referred to Dr Redmond.  She is here due to a family friend recommendation.  She has been elevating, been in significant pain.  Her medical history is complex, she has diabetes (good control, most recent reported HGA1c was 6--) and lymphoma complicated by DVT.  We called Dr Cabrera's office last week to find out if her anticoagulant can be stopped, whether she needed a Lovenox bridge.  They advised that the Xarelto can be stopped 2 days pre op, no bridge needed.      No current outpatient prescriptions on file prior to visit.     No current facility-administered medications on file prior to visit.       Allergies   Allergen Reactions   • Levofloxacin Hives   • Oxycodone-Acetaminophen Itching   • Sulfa Antibiotics Hives      Past Medical History:   Diagnosis Date   • Ankle fracture    • Coronary artery disease    • Diabetes (CMS/HCC)     DIAGNOSED APPROX 5 YEARS AGO. TYPE 2 TESTS DAILY   • Lymphoma (CMS/HCC)    • Migraines      Past Surgical History:   Procedure Laterality Date   • ANKLE OPEN REDUCTION INTERNAL FIXATION Right 2018    Rt. Ankle ORIF - Dr. Ashely Saba; Orthopedic Surgery    • CORONARY ARTERY BYPASS GRAFT       Family History   Problem Relation Age of Onset   • Cancer Mother    • Diabetes Mother       Social History     Social History   • Marital status: Unknown     Spouse name: N/A   • Number of children: N/A   • Years of education: N/A     Occupational History   •  "Not on file.     Social History Main Topics   • Smoking status: Never Smoker   • Smokeless tobacco: Never Used   • Alcohol use No   • Drug use: No   • Sexual activity: Defer     Other Topics Concern   • Not on file     Social History Narrative   • No narrative on file        Review of Systems   Constitutional: Negative.    HENT: Negative.    Eyes: Negative.    Respiratory: Negative.    Cardiovascular: Positive for leg swelling.   Gastrointestinal: Negative.    Endocrine: Negative.    Genitourinary: Negative.    Musculoskeletal: Positive for arthralgias, gait problem, joint swelling and myalgias.   Skin: Positive for color change.   Allergic/Immunologic: Negative.    Hematological: Negative.    Psychiatric/Behavioral: Negative.    All other systems reviewed and are negative.      The following portions of the patient's history were reviewed and updated as appropriate: allergies, current medications, past family history, past medical history, past social history, past surgical history and problem list.    Physical Exam:   Pulse 82   Ht 165.1 cm (65\")   Wt 64.4 kg (142 lb)   SpO2 99%   BMI 23.63 kg/m²   GENERAL: Body habitus: normal weight for height    Lower extremity edema: Left: none; Right: trace    Varicose veins:  Left: mild; Right: mild    Gait: in wheelchair     Mental Status:  awake and alert; oriented to person, place, and time    Voice:  clear  SKIN:  warm and dry    Hair Growth:  Right:diminished; Left:  diminished  NAILS: Toenails: normal  HEENT: Head: Normocephalic, atraumatic,  without obvious abnormality.  eye: normal external eye, no icterus  ears: normal external ears  nose: normal external nose  pharynx: dental hygiene adequate  PULM:  Repiratory effort normal  CV:  Dorsalis Pedis:  Right: 2+; Left:2+    Posterior Tibial: Right:2+; Left:2+    Capillary Refill:  Brisk  MSK:  Hand:right handed      Tibia:  Right:  non tender; Left:  non tender      Ankle:  Right: very tender medial and lateral " ankle, mild swelling, moderate ecchymosis, no fracture bilsters, no sign of compartment syndrome.  not tender in foot except 1cm area of skin necrosis from tight ace wrap from ER, no sign of infection, not loose, no drainage,.    moderate bunion and hammertoes, some pressure from splint over bunion, but no necrosis; Left:  non tender            NEURO: Heel Walking:  Right:  unable to test; Left:  unable to test    Toe Walking:  Right:  unable to test; Left:  unable to test     Oxford-Sally 5.07 monofilament test: patchy decrease    Lower extremity sensation: diminished        Calf Atrophy:none    Motor Function: all motors fire on right, give-way weakness due to pain, no sign of compartment syndrome         Medical Decision Making    Data Review:   ordered and reviewed x-rays today, reviewed radiology results and reviewed outside records    Assessment and Plan/ Diagnosis/Treatment options:   1.. Closed fracture of right ankle, initial encounter  She has a trimalleolar ankle fracture on the right.  It was not well reduced, but at least there was no skin compromise over the fractures.  I gently tried to improve reduction in the office, and we placed a more comfortable splint, (short leg fiberglass, non-wt bearing) with more padding and no pressure on the small area of necrosis over the dorsal foot (from tight ace wrap from ER).  I had a long discussion with patient and her sister.  I explained fractures of joints to the patient.  I explained that all joint fractures will develop some arthritis. The goal of treatment is to put the joint back in  place as anatomically as possible, and hold it there to heal.  I explained that fractures have stable and unstable patterns.  I think this fracture is too unstable to hold in a cast, I think the risk of malunion and non union are too high if we use cast treatment.  (it would need to be a long leg cast)  I would recommend surgery.   I explained the surgery, the 23 hour  admission.  I explained the incisions, the plate and screws.  I would assume the hardware is permanent, rarely it is bothersome and can be removed.  I explained the possibility of syndesmotic fixation , and the possible need to remove that (I will not know if this is necessary until we are in surgery). the post op regimen is 12 weeks non wt bearing in a short leg cast then 6-8 weeks walking in a boot doing physical therapy. .  I explained the posterior malleolar fracture is not large enough to need fixation.  I explained how all ankle fractures swell for months to years, some permanently.   I explained the risks including but not limited to death, infection, stroke, heart attacks, blood clots, neurovascular damage, stiffness, pain, arthritis, hardware failure, non union, malunion, amputation, etc. I explained that she is at increased risk for all complications given her medical history (neuropathy, DM, DVT, age, lymphoma).  The research literature shows that ankle fractures in this setting have a 50% risk of complication with operative OR non-operative treatment.  However, I do not think this can be treated non-operatively, it is too unstable.  I also explained that the area of necrosis will take a long time to heal.  I also explained  That she will have some permanent swelling, may lose ROM.   Questions were asked and answered in detail. Plan for surgery Thursday, 8/9      2. Type 2 diabetes mellitus without complication, without long-term current use of insulin (CMS/HCC)  She does have neuropathy on exam    3. Chronic deep vein thrombosis (DVT) of distal vein of right lower extremity (CMS/HCC)  As above, we will stop Xarelto today, renew post op

## 2018-08-09 NOTE — INTERVAL H&P NOTE
"Pre-Op H&P (See Recent Office Note Attached for Full H&P)    Chief complaint: Right ankle fracture    Review of Systems:  General ROS:  no fever, chills, rashes, No change since last office visit  Cardiovascular ROS: no chest pain or dyspnea on exertion.  +cardiac clearance  Respiratory ROS: no cough, shortness of breath, or wheezing    Meds:    No current facility-administered medications on file prior to encounter.      No current outpatient prescriptions on file prior to encounter.     Michelle LD 8/6/18    Vital Signs:  /100 (BP Location: Right arm, Patient Position: Lying)   Pulse 83   Temp 98 °F (36.7 °C) (Tympanic)   Resp 18   Ht 165.1 cm (65\")   Wt 64.4 kg (142 lb)   SpO2 99%   BMI 23.63 kg/m²     Physical Exam:    CV:  S1S2 regular rate and rhythm, no murmur               Resp:  Clear to auscultation; respirations regular, even and unlabored    Results Review:    I reviewed the patient's new clinical results.    Cancer Staging (if applicable)  Cancer Patient: __ yes _x_no __unknown; If yes, clinical stage T:__ N:__M:__, stage group or __N/A    Assessment/Plan:    1.. Closed fracture of right ankle, initial encounter  She has a trimalleolar ankle fracture on the right.  It was not well reduced, but at least there was no skin compromise over the fractures.  I gently tried to improve reduction in the office, and we placed a more comfortable splint, (short leg fiberglass, non-wt bearing) with more padding and no pressure on the small area of necrosis over the dorsal foot (from tight ace wrap from ER).  I had a long discussion with patient and her sister.  I explained fractures of joints to the patient.  I explained that all joint fractures will develop some arthritis. The goal of treatment is to put the joint back in  place as anatomically as possible, and hold it there to heal.  I explained that fractures have stable and unstable patterns.  I think this fracture is too unstable to hold in a cast, I " think the risk of malunion and non union are too high if we use cast treatment.  (it would need to be a long leg cast)  I would recommend surgery.   I explained the surgery, the 23 hour admission.  I explained the incisions, the plate and screws.  I would assume the hardware is permanent, rarely it is bothersome and can be removed.  I explained the possibility of syndesmotic fixation , and the possible need to remove that (I will not know if this is necessary until we are in surgery). the post op regimen is 12 weeks non wt bearing in a short leg cast then 6-8 weeks walking in a boot doing physical therapy. .  I explained the posterior malleolar fracture is not large enough to need fixation.  I explained how all ankle fractures swell for months to years, some permanently.   I explained the risks including but not limited to death, infection, stroke, heart attacks, blood clots, neurovascular damage, stiffness, pain, arthritis, hardware failure, non union, malunion, amputation, etc. I explained that she is at increased risk for all complications given her medical history (neuropathy, DM, DVT, age, lymphoma).  The research literature shows that ankle fractures in this setting have a 50% risk of complication with operative OR non-operative treatment.  However, I do not think this can be treated non-operatively, it is too unstable.  I also explained that the area of necrosis will take a long time to heal.  I also explained  That she will have some permanent swelling, may lose ROM.   Questions were asked and answered in detail.    Samara Velazquez, LOLIS  8/9/2018   9:07 AM

## 2018-08-09 NOTE — ANESTHESIA PROCEDURE NOTES
Peripheral Block    Patient location during procedure: post-op  Start time: 8/9/2018 9:49 AM  Stop time: 8/9/2018 9:53 AM  Reason for block: at surgeon's request and post-op pain management  Performed by  Anesthesiologist: DAVID PINZON  Preanesthetic Checklist  Completed: patient identified, site marked, surgical consent, pre-op evaluation, timeout performed, IV checked, risks and benefits discussed and monitors and equipment checked  Prep:  Sterile barriers:cap, gloves, mask and sterile barriers  Prep: ChloraPrep  Patient monitoring: blood pressure monitoring, continuous pulse oximetry and EKG  Procedure    Guidance:ultrasound guided  Images:still images obtained    Laterality:right  Block Type:adductor canal block  Injection Technique:catheter  Needle Type:Tuohy and echogenic  Needle Gauge:18 G    Catheter size: 20g.  Medications  Local Injected:bupivacaine 0.25% Local Amount Injected:10 (ml)mL  Post Assessment  Injection Assessment: negative aspiration for heme, incremental injection and no paresthesia on injection  Patient Tolerance:comfortable throughout block  Complications:no  Additional Notes  Procedure:             The pt was placed in the Supine position.  The Insertion site was  prepped and Draped in sterile fashion.  The pt was anesthetized with  IV Sedation( see meds).  Skin and cutaneous tissue was infiltrated and anesthetized with 1% Lidocaine 3 mls via a 25g needle.  A BBraun 4 inch 18g echogenic needle was then  inserted approximately midline, mid-thigh and advanced In-plane with Ultrasound guidance.  Normal Saline PSF was utilized for hydrodissection of tissue.  The Vastus medialis and Sartorius muscle where visualized and the needle tip was placed in the adductor canal,  lateral to the femoral artery.  LA injection spread was visualized, injection was incremental 1-5ml, injection pressure was normal or little, no intraneural injection, no vascular injection.  LA dose was injected thru the  needle(see dose above).  Thank you.

## 2018-08-09 NOTE — THERAPY EVALUATION
Acute Care - Physical Therapy Initial Evaluation  [facility]     Patient Name: Anaid Cao  : 1940  MRN: [id number]  Today's Date: 2018   Onset of Illness/Injury or Date of Surgery: 18  Date of Referral to PT: 18  Referring Physician: MD Wandy      Admit Date: 2018    Visit Dx:     ICD-10-CM ICD-9-CM   1. Impaired functional mobility, balance, gait, and endurance Z74.09 V49.89   2. Type 2 diabetes mellitus without complication, without long-term current use of insulin (CMS/HCC) E11.9 250.00   3. Closed fracture of right ankle, initial encounter S82.891A 824.8     Patient Active Problem List   Diagnosis   • Closed fracture of right ankle   • Type 2 diabetes mellitus without complication, without long-term current use of insulin (CMS/HCC)   • Chronic deep vein thrombosis (DVT) of distal vein of right lower extremity (CMS/HCC)   • Ankle fracture, bimalleolar, closed, right, initial encounter   • HLD (hyperlipidemia)   • CAD (coronary artery disease)   • S/P ORIF right trimalleolar ankle fracture and syndesmotic disruption     Past Medical History:   Diagnosis Date   • Ankle fracture    • Coronary artery disease    • Diabetes (CMS/HCC)     DIAGNOSED APPROX 5 YEARS AGO. TYPE 2 TESTS DAILY   • Lymphoma (CMS/HCC)    • Migraines      Past Surgical History:   Procedure Laterality Date   • ANKLE OPEN REDUCTION INTERNAL FIXATION Right 2018    Rt. Ankle ORIF - Dr. Ashely Saba; Orthopedic Surgery    • CORONARY ARTERY BYPASS GRAFT          PT ASSESSMENT (last 12 hours)      Physical Therapy Evaluation     Row Name 18 1624          PT Evaluation Time/Intention    Subjective Information complains of;numbness   from knee down d/t nerve catheter  -LR     Document Type evaluation  -LR     Mode of Treatment physical therapy;individual therapy  -LR     Patient Effort excellent  -LR     Symptoms Noted During/After Treatment none  -LR     Row Name 18 3739          General  Information    Patient Profile Reviewed? yes  -LR     Onset of Illness/Injury or Date of Surgery 08/09/18  -LR     Referring Physician MD Wandy  -LR     Patient Observations alert;cooperative;agree to therapy  -LR     Patient/Family Observations Daughter present.   -LR     General Observations of Patient Patient supine in bed upon arrival. R lower leg elevated, R popliteal nerve catheter, IV, O2, splint to R foot  -LR     Prior Level of Function independent:;all household mobility;community mobility;gait;transfer;bed mobility;ADL's;home management;cooking;cleaning;driving;shopping;using stairs;work   prior to fall on 7/27/18, using RW to hop after fall  -LR     Equipment Currently Used at Home wheelchair;shower chair;raised toilet;walker, rolling   not using prior to fall; been using RW to hop since fall  -LR     Pertinent History of Current Functional Problem Patient presents for surgical management of R ankle fx after falling off bed on 7/27/18.   -LR     Existing Precautions/Restrictions fall;non-weight bearing;right;other (see comments)   R popliteal nerve catheter  -LR     Equipment Issued to Patient --   none  -LR     Equipment Ordered for Patient --   none  -LR     Risks Reviewed patient and family:;LOB;nausea/vomiting;dizziness;increased discomfort;lines disloged  -LR     Benefits Reviewed patient and family:;improve function;increase independence;increase strength;increase balance;decrease pain;decrease risk of DVT;increase knowledge  -LR     Barriers to Rehab none identified  -LR     Row Name 08/09/18 1624          Relationship/Environment    Primary Source of Support/Comfort significant other;other (see comments);child(marquis)   daughter to assist at all times  -LR     Lives With significant other  -LR     Row Name 08/09/18 162          Resource/Environmental Concerns    Current Living Arrangements home/apartment/condo   discharging home to sister's house  -LR     Resource/Environmental Concerns none   -LR     Transportation Concerns car, none  -LR     Row Name 08/09/18 1627          Home Main Entrance    Number of Stairs, Main Entrance one  -LR     Stair Railings, Main Entrance none  -LR     Stairs Comment, Main Entrance daughter reports it is a small step and they plan to take patient in on w/c.   -LR     Row Name 08/09/18 1627          Cognitive Assessment/Intervention- PT/OT    Orientation Status (Cognition) oriented x 4  -LR     Follows Commands (Cognition) WFL;follows one step commands;over 90% accuracy;verbal cues/prompting required  -LR     Safety Deficit (Cognitive) mild deficit;at risk behavior observed;awareness of need for assistance;impulsivity;insight into deficits/self awareness;judgment;safety precautions awareness;safety precautions follow-through/compliance  -LR     Row Name 08/09/18 1627          Safety Issues, Functional Mobility    Safety Issues Affecting Function (Mobility) at risk behavior observed;awareness of need for assistance;impulsivity;insight into deficits/self awareness;judgment;positioning of assistive device;safety precaution awareness;safety precautions follow-through/compliance;sequencing abilities  -LR     Row Name 08/09/18 1627          Mobility Assessment/Treatment    Extremity Weight-bearing Status right lower extremity  -LR     Right Lower Extremity (Weight-bearing Status) non weight-bearing (NWB)  -LR     Row Name 08/09/18 1627          Bed Mobility Assessment/Treatment    Bed Mobility Assessment/Treatment supine-sit  -LR     Supine-Sit [place] (Bed Mobility) verbal cues;supervision  -LR     Bed Mobility, Safety Issues decreased use of legs for bridging/pushing  -LR     Assistive Device (Bed Mobility) head of bed elevated;bed rails  -LR     Comment (Bed Mobility) Verbal cues to move LEs towards EOB and to push up from bed from behind to raise trunk into sitting and to scoot hips out to get feet on floor. Denied dizziness upon sitting up.   -LR     Row Name 08/09/18  1627          Transfer Assessment/Treatment    Transfer Assessment/Treatment sit-stand transfer;stand-sit transfer;toilet transfer  -LR     Maintains Weight-bearing Status (Transfers) able to maintain;verbal cues to maintain  -LR     Comment (Transfers) Verbal cues to push up from bed to stand and to reach back for chair to sit. Verbal cues for NWB on R LE during t/f. Assisted from bed to BSC and then from BSC to chair. Patient stood from bed before being cued to do so and before RW and gait belt in place. Did not reach back for BSC to lower into sitting despite cues to do so.   -LR     Sit-Stand [place] (Transfers) verbal cues;contact guard;1 person to manage equipment  -LR     Stand-Sit [place] (Transfers) verbal cues;contact guard  -LR     [place] Level (Toilet Transfer) verbal cues;contact guard  -LR     Assistive Device (Toilet Transfer) walker, front-wheeled;commode, bedside without drop arms  -LR     Row Name 08/09/18 1627          Sit-Stand Transfer    Assistive Device (Sit-Stand Transfers) walker, front-wheeled  -LR     Row Name 08/09/18 1627          Stand-Sit Transfer    Assistive Device (Stand-Sit Transfers) walker, front-wheeled  -LR     Row Name 08/09/18 1627          Toilet Transfer    Type (Toilet Transfer) sit-stand;stand-sit  -LR     Row Name 08/09/18 1627          Gait/Stairs Assessment/Training    [place] Level (Gait) verbal cues;contact guard;1 person to manage equipment  -LR     Assistive Device (Gait) walker, front-wheeled  -LR     Distance in Feet (Gait) 5  -LR     Pattern (Gait) swing-to  -LR     Comment (Gait/Stairs) Patient requested to defer gait training on rolling knee walker until the AM. Patient able to hop on L LE to t/f from bed to BSC and then from BSC to chair. Slightly unsteady when hopping backwards to chair. Gait limited by pain.   -LR     Row Name 08/09/18 1627          General ROM    RT Lower Ext Comment  -LR     LT Lower Ext Comment  -LR     Row Name  08/09/18 1627          Right Lower Ext    RT Lower Extremity Comments R LE AROM WFL, except for ankle, R ankle AROM impaired 100% d/t splint  -LR     Row Name 08/09/18 1627          Left Lower Ext    LT Lower Extremity Comments L LE AROM WFL  -LR     Row Name 08/09/18 1627          General Assessment (Manual Muscle Testing)    General Manual Muscle Testing (MMT) Assessment lower extremity strength deficits identified  -     Row Name 08/09/18 1627          Lower Extremity (Manual Muscle Testing)    Lower Extremity: Manual Muscle Testing (MMT) right ankle strength deficit  -LR     Row Name 08/09/18 1627          Right Ankle/Foot (Manual Muscle Testing)    Comment, MMT: Right Ankle/Foot R ankle 0/0, fixed in splint  -     Row Name 08/09/18 1627          Motor Assessment/Intervention    Additional Documentation Balance (Group);Therapeutic Exercise (Group);Therapeutic Exercise Interventions (Group)  -     Row Name 08/09/18 1627          Therapeutic Exercise    45601 - PT Therapeutic Activity Minutes 8  -     Row Name 08/09/18 1627          Balance    Balance static standing balance  -     Row Name 08/09/18 1627          Static Standing Balance    Level of [place] (Supported Standing, Static Balance) contact guard assist  -LR     Time Able to Maintain Position (Supported Standing, Static Balance) 1 to 2 minutes  -LR     Assistive Device Utilized (Supported Standing, Static Balance) rolling walker  -     Row Name 08/09/18 1627          Sensory Assessment/Intervention    Sensory General Assessment light touch sensation deficits identified   reports numbness from R Knee down  -LR     Row Name 08/09/18 1627          Vision Assessment/Intervention    Visual Impairment/Limitations WFL  -     Row Name 08/09/18 1627          Light Touch Sensation Assessment    Right Lower Extremity: Light Touch Sensation Assessment severe impairment, less than 50% correct responses   from R knee down d/t nerve catheter  -LR      Row Name 08/09/18 1627          Pain Assessment    Additional Documentation Pain Scale: Numbers Pre/Post-Treatment (Group)  -LR     Row Name 08/09/18 1627          Pain Scale: Numbers Pre/Post-Treatment    Pain Scale: Numbers, Pretreatment 0/10 - no pain  -LR     Pain Scale: Numbers, Post-Treatment 4/10  -LR     Pain Location - Side Right  -LR     Pain Location foot  -LR     Pain Intervention(s) Repositioned;Ambulation/increased activity  -LR     Row Name             Wound 08/09/18 1120 Right other (see comments) foot other (see comments)    Wound - Properties Group Date first assessed: 08/09/18  -LD Time first assessed: 1120  -LD Present On Admission : yes  -LD Side: Right  -LD Orientation: other (see comments)  -LD, top  Location: foot  -LD Type: other (see comments)  -LD, pressure injury  Stage, Pressure Injury: deep tissue injury;Stage 4  -LD    Row Name             Wound 08/09/18 1120 Right toe pressure injury    Wound - Properties Group Date first assessed: 08/09/18  -LD Time first assessed: 1120  -LD Present On Admission : no  -LD Side: Right  -LD Location: toe  -LD, great toe - medial  Type: pressure injury  -LD Stage, Pressure Injury: Stage 1  -LD    Row Name             Wound 08/09/18 1316 Right ankle incision    Wound - Properties Group Date first assessed: 08/09/18  -LD Time first assessed: 1316  -LD Side: Right  -LD Location: ankle  -LD Type: incision  -LD    Row Name 08/09/18 1627          Coping    Observed Emotional State accepting;cooperative  -LR     Verbalized Emotional State acceptance  -LR     Row Name 08/09/18 1627          Plan of Care Review    Plan of Care Reviewed With patient;daughter  -LR     Row Name 08/09/18 1627          Physical Therapy Clinical Impression    Date of Referral to PT 08/09/18  -LR     PT Diagnosis (PT Clinical Impression) R trimalleolar ankle fx with syndesmotic disruption/ s/p ORIF R trimalleolar ankle fx without fixation of posterior lip, ORIF syndesmotic disruption   -LR     Prognosis (PT Clinical Impression) good  -LR     Patient/Family Goals Statement (PT Clinical Impression) go home  -LR     Criteria for Skilled Interventions Met (PT Clinical Impression) yes;treatment indicated  -LR     Rehab Potential (PT Clinical Summary) good, to achieve stated therapy goals  -LR     Care Plan Review (PT) evaluation/treatment results reviewed;care plan/treatment goals reviewed;risks/benefits reviewed;current/potential barriers reviewed;patient/other agree to care plan  -LR     Care Plan Review, Other Participant (PT Clinical Impression) daughter  -LR     Row Name 08/09/18 1627          Physical Therapy Goals    Bed Mobility Goal Selection (PT) bed mobility, PT goal 1  -LR     Transfer Goal Selection (PT) transfer, PT goal 1;transfer, PT goal 2  -LR     Gait Training Goal Selection (PT) gait training, PT goal 1  -LR     Row Name 08/09/18 1627          Bed Mobility Goal 1 (PT)    Activity/Assistive Device (Bed Mobility Goal 1, PT) sit to supine/supine to sit  -LR     [place] Level/Cues Needed (Bed Mobility Goal 1, PT) conditional independence  -LR     Time Frame (Bed Mobility Goal 1, PT) long term goal (LTG);5 days  -LR     Progress/Outcomes (Bed Mobility Goal 1, PT) goal ongoing  -LR     Row Name 08/09/18 1627          Transfer Goal 1 (PT)    Activity/Assistive Device (Transfer Goal 1, PT) sit-to-stand/stand-to-sit;walker, rolling  -LR     [place] Level/Cues Needed (Transfer Goal 1, PT) conditional independence  -LR     Time Frame (Transfer Goal 1, PT) long term goal (LTG);5 days  -LR     Progress/Outcome (Transfer Goal 1, PT) goal ongoing  -LR     Row Name 08/09/18 1627          Transfer Goal 2 (PT)    Activity/Assistive Device (Transfer Goal 2, PT) bed-to-chair/chair-to-bed;walker, rolling  -LR     [place] Level/Cues Needed (Transfer Goal 2, PT) conditional independence  -LR     Time Frame (Transfer Goal 2, PT) long term goal (LTG);5 days  -LR     Progress/Outcome (Transfer  Goal 2, PT) goal ongoing  -LR     Row Name 08/09/18 1627          Gait Training Goal 1 (PT)    Activity/Assistive Device (Gait Training Goal 1, PT) gait (walking locomotion);other (see comments)   rolling knee walker  -LR     [place] Level (Gait Training Goal 1, PT) conditional independence  -LR     Distance (Gait Goal 1, PT) 150 feet  -LR     Time Frame (Gait Training Goal 1, PT) long term goal (LTG);5 days  -LR     Progress/Outcome (Gait Training Goal 1, PT) goal ongoing  -LR     Row Name 08/09/18 1627          Positioning and Restraints    Pre-Treatment Position in bed  -LR     Post Treatment Position chair  -LR     In Chair notified nsg;reclined;sitting;call light within reach;encouraged to call for assist;with family/caregiver;legs elevated;RLE elevated  -LR     Row Name 08/09/18 1627          Living Environment    Home Accessibility wheelchair accessible;stairs to enter home;tub/shower is not walk in  -LR       User Key  (r) = Recorded By, (t) = Taken By, (c) = Cosigned By    Initials Name Provider Type    LR Gabriela Jolley, PT Physical Therapist    Lisa Roque RN Registered Nurse          Physical Therapy Education     Title: PT OT SLP Therapies (Done)     Topic: Physical Therapy (Done)     Point: Mobility training (Done)    Learning Progress Summary     Learner Status Readiness Method Response Comment Documented by    Patient Done Acceptance E,D VU,NR Educated on NWB status of R LE, correct t/f technique, and progression of POC.  08/09/18 1659    Family Done Acceptance E,D VU,NR Educated on NWB status of R LE, correct t/f technique, and progression of POC.  08/09/18 1659          Point: Home exercise program (Done)    Learning Progress Summary     Learner Status Readiness Method Response Comment Documented by    Patient Done Acceptance E,D VU,NR Educated on NWB status of R LE, correct t/f technique, and progression of POC.  08/09/18 1659    Family Done Acceptance E,D VU,NR  Educated on NWB status of R LE, correct t/f technique, and progression of POC. LR 08/09/18 1659          Point: Body mechanics (Done)    Learning Progress Summary     Learner Status Readiness Method Response Comment Documented by    Patient Done Acceptance E,D VU,NR Educated on NWB status of R LE, correct t/f technique, and progression of POC. LR 08/09/18 1659    Family Done Acceptance E,D VU,NR Educated on NWB status of R LE, correct t/f technique, and progression of POC. LR 08/09/18 1659          Point: Precautions (Done)    Learning Progress Summary     Learner Status Readiness Method Response Comment Documented by    Patient Done Acceptance E,D VU,NR Educated on NWB status of R LE, correct t/f technique, and progression of POC. LR 08/09/18 1659    Family Done Acceptance E,D VU,NR Educated on NWB status of R LE, correct t/f technique, and progression of POC.  08/09/18 1659                      User Key     Initials Effective Dates Name Provider Type Discipline     06/19/15 -  Gabriela Jolley, PT Physical Therapist PT                PT Recommendation and Plan  Anticipated Discharge Disposition (PT): home with assist  Planned Therapy Interventions (PT Eval): balance training, bed mobility training, gait training, home exercise program, patient/family education, ROM (range of motion), strengthening, transfer training  Therapy Frequency (PT Clinical Impression): daily  Outcome Summary/Treatment Plan (PT)  Anticipated Equipment Needs at Discharge (PT): rolling knee walker  Anticipated Discharge Disposition (PT): home with assist  Plan of Care Reviewed With: patient, daughter  Progress: improving  Outcome Summary: Patient declined initiating gait training with rolling knee walker until AM. Patient able to hop and t/f from bed to BSC and from BSC to chair, requiring only CGA for safety. Plan is d/c home with family. Will continue to progress mobility training as able.           Outcome Measures     Row Name  08/09/18 1627             How much help from another person do you currently need...    Turning from your back to your side while in flat bed without using bedrails? 4  -LR      Moving from lying on back to sitting on the side of a flat bed without bedrails? 3  -LR      Moving to and from a bed to a chair (including a wheelchair)? 3  -LR      Standing up from a chair using your arms (e.g., wheelchair, bedside chair)? 3  -LR      Climbing 3-5 steps with a railing? 2  -LR      To walk in hospital room? 3  -LR      AM-PAC 6 Clicks Score 18  -LR         Functional Assessment    Outcome Measure Options AM-PAC 6 Clicks Basic Mobility (PT)  -LR        User Key  (r) = Recorded By, (t) = Taken By, (c) = Cosigned By    Initials Name Provider Type    Gabriela Maya, PT Physical Therapist           Time Calculation:         PT Charges     Row Name 08/09/18 1627             Time Calculation    Start Time 1627  -LR      PT Received On 08/09/18  -LR      PT Goal Re-Cert Due Date 08/19/18  -LR         Time Calculation- PT    Total Timed Code Minutes- PT 8 minute(s)  -LR         Timed Charges    09871 - PT Therapeutic Activity Minutes 8  -LR        User Key  (r) = Recorded By, (t) = Taken By, (c) = Cosigned By    Initials Name Provider Type    Gabriela Maya, PT Physical Therapist        Therapy Suggested Charges     Code   Minutes Charges    29443 (CPT®) Hc Pt Neuromusc Re Education Ea 15 Min      07025 (CPT®) Hc Pt Ther Proc Ea 15 Min      02945 (CPT®) Hc Gait Training Ea 15 Min      10799 (CPT®) Hc Pt Therapeutic Act Ea 15 Min 8 1    22291 (CPT®) Hc Pt Manual Therapy Ea 15 Min      49197 (CPT®) Hc Pt Iontophoresis Ea 15 Min      65472 (CPT®) Hc Pt Elec Stim Ea-Per 15 Min      34934 (CPT®) Hc Pt Ultrasound Ea 15 Min      03343 (CPT®) Hc Pt Self Care/Mgmt/Train Ea 15 Min      Total  8 1        Therapy Charges for Today     Code Description Service Date Service Provider Modifiers Qty    51530395119 HC PT  THERAPEUTIC ACT EA 15 MIN 8/9/2018 Gabriela Jolley, PT GP 1    34277604689 HC PT THER SUPP EA 15 MIN 8/9/2018 Gabreila Jolley, PT GP 2    56750733962 HC PT EVAL LOW COMPLEXITY 2 8/9/2018 Gabriela Jolley, PT GP 1          PT G-Codes  Outcome Measure Options: AM-PAC 6 Clicks Basic Mobility (PT)      Gabriela Jolley, PT  8/9/2018

## 2018-08-09 NOTE — PROGRESS NOTES
Orthopedic Foot/Ankle Progress Note    Subjective     Post-Op: s/p ORIF right ankle fx   Systemic or Specific Complaints: sleepy, reasonable pain control  Objective     Vital signs in last 24 hours:  Temp:  [97.5 °F (36.4 °C)-98 °F (36.7 °C)] 97.5 °F (36.4 °C)  Heart Rate:  [64-83] 64  Resp:  [16-18] 16  BP: (130-155)/() 141/65    Neurovascular: Right toes pink, decrease motor/sense due to block   Wound: Splint dry         Data Review  Lab Results (last 24 hours)     Procedure Component Value Units Date/Time    POC Glucose Once [[id number]]  (Normal) Collected:  08/09/18 0842    Specimen:  Blood Updated:  08/09/18 0843     Glucose 127 mg/dL     Narrative:       Meter: [id number] : [id number] [place]            Assessment/Plan     Status post- stable s/p ORIF right ankle fx, admit for elevation,pain control, glucose control           Ashely Saba MD    Date: 8/9/2018  Time: 2:22 PM

## 2018-08-09 NOTE — H&P
Patient Name: Anaid Cao  MRN: [id number]  : 1940  DOS: 2018    Attending: Asheyl Saba MD    Primary Care Provider: Geetha Cabrera MD      Chief complaint:  Right ankle fracture    Subjective   Patient is a 78 y.o. female presented for ORIF right trimalleolar ankle fracture without fixation of posterior lip, ORIF syndesmotic disruption by Dr. Saba under GA. She tolerated surgery well and is admitted for further medical management. She was standing on her bed to adjust a ceiling fan and fell off the bed and fractured her ankle.     Per Dr. Saba's note: (((This is an extremely pleasant 78-year-old woman with a comminuted, unstable right trimalleolar ankle fracture dislocation.  I found she had an unstable syndesmosis at the time of injury.  She has well-controlled diabetes, but she does have neuropathy.  She has an existing area of skin necrosis about the size of a nickel on the dorsum of the foot and the medial first metatarsal head that were caused by the initial splint from the emergency room.  They are both stable, there not loose, there is no signs of infection.  She does not have any fracture blisters, her swelling is under control.  We discussed operative and nonoperative treatment.  This is a highly unstable fracture and I felt cast treatment would result in a very poor outcome.  We also discussed the fact that with operative and non-operative treatment there is a 50% risk of complications given all of the medical problems in this setting.  At the time of surgery I was able to get very good fixation of the fractures in the syndesmosis.)))    When seen postop she is doing well. Her pain control is good. She denies nausea, shortness of breath or chest pain.     She does have history DVT. She has been on Xarelto for about 3 months.    Allergies:  Allergies   Allergen Reactions   • Sulfa Antibiotics Hives   • Levofloxacin Hives   • Oxycodone Itching       Meds:  Prescriptions Prior  to Admission   Medication Sig Dispense Refill Last Dose   • citalopram (CeleXA) 20 MG tablet citalopram 20 mg tablet   1 Daily   8/8/2018 at Unknown time   • HYDROcodone-acetaminophen (NORCO) 7.5-325 MG per tablet Take 1-2 tablets by mouth Every 6 (Six) Hours As Needed for Moderate Pain . 60 tablet 0 8/9/2018 at Unknown time   • metFORMIN (GLUCOPHAGE) 500 MG tablet Take 500 mg by mouth 2 (Two) Times a Day With Meals.   8/8/2018 at Unknown time   • ondansetron (ZOFRAN) 4 MG tablet Take 1 tablet by mouth Every 6 (Six) Hours As Needed for Nausea or Vomiting. 30 tablet 0 8/8/2018 at Unknown time   • rosuvastatin (CRESTOR) 5 MG tablet Take 5 mg by mouth Daily.   8/8/2018 at Unknown time   • topiramate (TOPAMAX) 25 MG tablet Topamax 25 mg tablet   Take 2 tablets every day by oral route.   8/8/2018 at Unknown time   • aspirin 325 MG tablet aspirin 325 mg tablet   Daily   8/8/2018   • aspirin 325 MG tablet Take 325 mg by mouth Daily.      • HYDROcodone-acetaminophen (NORCO) 7.5-325 MG per tablet Take 1-2 tablets by mouth Every 6 (Six) Hours As Needed for Moderate Pain  (as needed for pain). 60 tablet 0    • metFORMIN (GLUCOPHAGE) 500 MG tablet metformin 500 mg tablet   1 tab Two times a day      • rivaroxaban (XARELTO) 20 MG tablet Xarelto 20 mg tablet   Take 1 tablet every day by oral route.      • rivaroxaban (XARELTO) 20 MG tablet Take 20 mg by mouth Daily.   8/6/2018   • rosuvastatin (CRESTOR) 5 MG tablet rosuvastatin 5 mg tablet   1 daily      • topiramate (TOPAMAX) 25 MG capsule Take 50 mg by mouth 2 (Two) Times a Day.          History:   Past Medical History:   Diagnosis Date   • Ankle fracture    • Coronary artery disease    • Diabetes (CMS/HCC)     DIAGNOSED APPROX 5 YEARS AGO. TYPE 2 TESTS DAILY   • Lymphoma (CMS/HCC)    • Migraines      Past Surgical History:   Procedure Laterality Date   • ANKLE OPEN REDUCTION INTERNAL FIXATION Right 08/09/2018    Rt. Ankle ORIF - Dr. Ashely Saba; Orthopedic Surgery    •  "CORONARY ARTERY BYPASS GRAFT       Family History   Problem Relation Age of Onset   • Cancer Mother    • Diabetes Mother      Social History   Substance Use Topics   • Smoking status: Never Smoker   • Smokeless tobacco: Never Used   • Alcohol use No   She lives with her significant other. She has 3 children. She works at school cafeteria.    Review of Systems  Pertinent items are noted in HPI, all other systems reviewed and negative    Vital Signs  /98 (BP Location: Right arm, Patient Position: Lying)   Pulse 76   Temp 97.8 °F (36.6 °C) (Temporal Artery )   Resp 16   Ht 165.1 cm (65\")   Wt 64.4 kg (142 lb)   SpO2 100%   BMI 23.63 kg/m²     Physical Exam:    General Appearance:    Alert, cooperative, in no acute distress   Head:    Normocephalic, without obvious abnormality, atraumatic   Eyes:            Lids and lashes normal, conjunctivae and sclerae normal, no   icterus, no pallor, corneas clear, PERRLA   Ears:    Ears appear intact with no abnormalities noted   Neck:   No adenopathy, supple, trachea midline, no thyromegaly, no     carotid bruit, no JVD   Lungs:     Clear to auscultation,respirations regular, even and                   unlabored    Heart:    Regular rhythm and normal rate, normal S1 and S2, no            murmur, no gallop, no rub, no click   Abdomen:     Normal bowel sounds, no masses, no organomegaly, soft        non-tender, non-distended, no guarding, no rebound                 tenderness   Genitalia:    Deferred   Extremities:   LLE splint CDI. Good cap refill toes. Nerve block present   Pulses:   Pulses palpable and equal bilaterally   Skin:   No bleeding, bruising or rash   Neurologic:   Cranial nerves 2 - 12 grossly intact, sensation intact      I reviewed the patient's new clinical results.         Results from last 7 days  Lab Units 08/07/18  1201   WBC 10*3/mm3 4.88   HEMOGLOBIN g/dL 10.9*   HEMATOCRIT % 34.9   PLATELETS 10*3/mm3 209       Results from last 7 days  Lab Units " 08/07/18  1201   SODIUM mmol/L 141   POTASSIUM mmol/L 4.4   CHLORIDE mmol/L 107   CO2 mmol/L 25.0   BUN mg/dL 20   CREATININE mg/dL 0.93   CALCIUM mg/dL 9.5   GLUCOSE mg/dL 131*     Lab Results   Component Value Date    HGBA1C 5.80 (H) 08/07/2018       Assessment and Plan:   Principal Problem:    S/P ORIF right trimalleolar ankle fracture and syndesmotic disruption  Active Problems:    Ankle fracture, bimalleolar, closed, right, initial encounter    Type 2 diabetes mellitus without complication, without long-term current use of insulin (CMS/HCC)    Chronic deep vein thrombosis (DVT) of distal vein of right lower extremity (CMS/HCC)    HLD (hyperlipidemia)    CAD (coronary artery disease)      Plan  1. PT/OT- NWB RLE  2. Pain control-prns, popliteal nerve block  3. IS-encourage  4. DVT proph- mechs/Lovenox  5. Bowel regimen  6. Resume home medications as appropriate  7. Monitor post-op labs  8. DC planning for home, likely tomorrow    DM  - hgb A1c on 8/7/18 5.8  - Hold metformin while inpatient  - Accuchecks ACHS with low dose SSI    HLD, CAD, HO DVT  - Continue home statin  - resume xarelto when ok with Dr. Wandy Darnell, LOLIS  08/09/18  4:28 PM   I have personally performed a diagnostic evaluation on this patient. My history is consistant  with HPI obtained. My exam finding are listed above.I have personally reviewed lab and x ray finding. I have personally reviewed and discussed the above formulated treatment plan with NADIYA.

## 2018-08-09 NOTE — ANESTHESIA POSTPROCEDURE EVALUATION
Patient: Anaid Cao    Procedure Summary     Date:  08/09/18 Room / Location:   [facility]    Anesthesia Start:  1121 Anesthesia Stop:      Procedure:  ORIF right ankle fracture, syndesmosis repair (Right Ankle) Diagnosis:       Closed fracture of right ankle, initial encounter      Type 2 diabetes mellitus without complication, without long-term current use of insulin (CMS/[facility])      (Closed fracture of right ankle, initial encounter [S82.891A])      (Type 2 diabetes mellitus without complication, without long-term current use of insulin (CMS/[facility]) [E11.9])    Surgeon:  Ashely Saba MD Provider:  Eliud Vega MD    Anesthesia Type:  general, regional ASA Status:  3          Anesthesia Type: general, regional  Last vitals  BP   130/100 (08/09/18 0845)146/79   Temp   98 °F (36.7 °C) (08/09/18 0845)97.7   Pulse   83 (08/09/18 0845)69   Resp   18 (08/09/18 0845)  16   SpO2   99 % (08/09/18 0845)99     Post Anesthesia Care and Evaluation    Patient location during evaluation: PACU  Patient participation: complete - patient participated  Level of consciousness: awake and alert  Pain score: 0  Pain management: adequate  Airway patency: patent  Anesthetic complications: No anesthetic complications  PONV Status: none  Cardiovascular status: hemodynamically stable and acceptable  Respiratory status: nonlabored ventilation, acceptable and nasal cannula  Hydration status: acceptable

## 2018-08-10 VITALS
WEIGHT: 142 LBS | OXYGEN SATURATION: 96 % | DIASTOLIC BLOOD PRESSURE: 53 MMHG | RESPIRATION RATE: 16 BRPM | SYSTOLIC BLOOD PRESSURE: 110 MMHG | HEIGHT: 65 IN | HEART RATE: 68 BPM | TEMPERATURE: 97.8 F | BODY MASS INDEX: 23.66 KG/M2

## 2018-08-10 PROBLEM — G89.18 ACUTE POSTOPERATIVE PAIN: Status: ACTIVE | Noted: 2018-08-10

## 2018-08-10 PROBLEM — D62 ACUTE BLOOD LOSS ANEMIA: Status: ACTIVE | Noted: 2018-08-10

## 2018-08-10 LAB
ANION GAP SERPL CALCULATED.3IONS-SCNC: 6 MMOL/L (ref 3–11)
BUN BLD-MCNC: 17 MG/DL (ref 9–23)
BUN/CREAT SERPL: 17.7 (ref 7–25)
CALCIUM SPEC-SCNC: 9.3 MG/DL (ref 8.7–10.4)
CHLORIDE SERPL-SCNC: 108 MMOL/L (ref 99–109)
CO2 SERPL-SCNC: 24 MMOL/L (ref 20–31)
CREAT BLD-MCNC: 0.96 MG/DL (ref 0.6–1.3)
DEPRECATED RDW RBC AUTO: 46.4 FL (ref 37–54)
ERYTHROCYTE [DISTWIDTH] IN BLOOD BY AUTOMATED COUNT: 13.7 % (ref 11.3–14.5)
GFR SERPL CREATININE-BSD FRML MDRD: 56 ML/MIN/1.73
GLUCOSE BLD-MCNC: 99 MG/DL (ref 70–100)
GLUCOSE BLDC GLUCOMTR-MCNC: 103 MG/DL (ref 70–130)
GLUCOSE BLDC GLUCOMTR-MCNC: 183 MG/DL (ref 70–130)
HCT VFR BLD AUTO: 32.6 % (ref 34.5–44)
HGB BLD-MCNC: 10.2 G/DL (ref 11.5–15.5)
MCH RBC QN AUTO: 29.1 PG (ref 27–31)
MCHC RBC AUTO-ENTMCNC: 31.3 G/DL (ref 32–36)
MCV RBC AUTO: 93.1 FL (ref 80–99)
PLATELET # BLD AUTO: 197 10*3/MM3 (ref 150–450)
PMV BLD AUTO: 10.4 FL (ref 6–12)
POTASSIUM BLD-SCNC: 4.7 MMOL/L (ref 3.5–5.5)
RBC # BLD AUTO: 3.5 10*6/MM3 (ref 3.89–5.14)
SODIUM BLD-SCNC: 138 MMOL/L (ref 132–146)
WBC NRBC COR # BLD: 7.05 10*3/MM3 (ref 3.5–10.8)

## 2018-08-10 PROCEDURE — 80048 BASIC METABOLIC PNL TOTAL CA: CPT | Performed by: NURSE PRACTITIONER

## 2018-08-10 PROCEDURE — 94799 UNLISTED PULMONARY SVC/PX: CPT

## 2018-08-10 PROCEDURE — 99024 POSTOP FOLLOW-UP VISIT: CPT | Performed by: ORTHOPAEDIC SURGERY

## 2018-08-10 PROCEDURE — 97116 GAIT TRAINING THERAPY: CPT

## 2018-08-10 PROCEDURE — 82962 GLUCOSE BLOOD TEST: CPT

## 2018-08-10 PROCEDURE — 85027 COMPLETE CBC AUTOMATED: CPT | Performed by: NURSE PRACTITIONER

## 2018-08-10 PROCEDURE — 25010000003 CEFAZOLIN IN DEXTROSE 2-4 GM/100ML-% SOLUTION: Performed by: ORTHOPAEDIC SURGERY

## 2018-08-10 PROCEDURE — 25010000002 ENOXAPARIN PER 10 MG: Performed by: ORTHOPAEDIC SURGERY

## 2018-08-10 PROCEDURE — G0378 HOSPITAL OBSERVATION PER HR: HCPCS

## 2018-08-10 RX ORDER — PSEUDOEPHEDRINE HCL 30 MG
100 TABLET ORAL 2 TIMES DAILY
Qty: 60 CAPSULE | Refills: 0 | Status: SHIPPED | OUTPATIENT
Start: 2018-08-10

## 2018-08-10 RX ORDER — ROSUVASTATIN CALCIUM 10 MG/1
5 TABLET, COATED ORAL NIGHTLY
Status: DISCONTINUED | OUTPATIENT
Start: 2018-08-10 | End: 2018-08-10 | Stop reason: HOSPADM

## 2018-08-10 RX ORDER — ROPIVACAINE IN 0.9% SOD CHL/PF 0.2% 545ML
6 ELASTOMERIC PUMP, HI VARIABLE RATE INJECTION CONTINUOUS
Start: 2018-08-10

## 2018-08-10 RX ORDER — DOCUSATE SODIUM 100 MG/1
100 CAPSULE, LIQUID FILLED ORAL 2 TIMES DAILY
Status: DISCONTINUED | OUTPATIENT
Start: 2018-08-10 | End: 2018-08-10 | Stop reason: HOSPADM

## 2018-08-10 RX ORDER — CITALOPRAM 20 MG/1
20 TABLET ORAL NIGHTLY
Status: DISCONTINUED | OUTPATIENT
Start: 2018-08-10 | End: 2018-08-10 | Stop reason: HOSPADM

## 2018-08-10 RX ADMIN — INSULIN LISPRO 2 UNITS: 100 INJECTION, SOLUTION INTRAVENOUS; SUBCUTANEOUS at 11:54

## 2018-08-10 RX ADMIN — ACETAMINOPHEN 325 MG: 325 TABLET, FILM COATED ORAL at 14:29

## 2018-08-10 RX ADMIN — CEFAZOLIN SODIUM 2 G: 2 INJECTION, SOLUTION INTRAVENOUS at 10:38

## 2018-08-10 RX ADMIN — Medication 1 TABLET: at 08:40

## 2018-08-10 RX ADMIN — HYDROCODONE BITARTRATE AND ACETAMINOPHEN 1 TABLET: 7.5; 325 TABLET ORAL at 15:35

## 2018-08-10 RX ADMIN — CEFAZOLIN SODIUM 2 G: 2 INJECTION, SOLUTION INTRAVENOUS at 03:51

## 2018-08-10 RX ADMIN — DOCUSATE SODIUM 100 MG: 100 CAPSULE, LIQUID FILLED ORAL at 10:38

## 2018-08-10 RX ADMIN — ENOXAPARIN SODIUM 40 MG: 40 INJECTION SUBCUTANEOUS at 08:40

## 2018-08-10 NOTE — DISCHARGE SUMMARY
Patient Name: Anaid Cao  MRN: [id number]  : 1940  DOS: 8/10/2018    Attending: Ashely Saba MD    Primary Care Provider: Geetha Cabrera MD    Date of Admission:.2018  7:53 AM    Date of Discharge:  8/10/2018    Discharge Diagnosis: Principal Problem:    S/P ORIF right trimalleolar ankle fracture and syndesmotic disruption  Active Problems:    Ankle fracture, bimalleolar, closed, right, initial encounter    Type 2 diabetes mellitus without complication, without long-term current use of insulin (CMS/HCC)    Chronic deep vein thrombosis (DVT) of distal vein of right lower extremity (CMS/HCC)    HLD (hyperlipidemia)    CAD (coronary artery disease)    Acute postoperative pain    Acute blood loss anemia, mild, asymptomatic      Hospital Course  Patient is a 78 y.o. female presented for ORIF right trimalleolar ankle fracture without fixation of posterior lip, ORIF syndesmotic disruption by Dr. Saba under GA. She tolerated surgery well and is admitted for further medical management. She was standing on her bed to adjust a ceiling fan and fell off the bed and fractured her ankle.     Per Dr. Saba's note: (((This is an extremely pleasant 78-year-old woman with a comminuted, unstable right trimalleolar ankle fracture dislocation.  I found she had an unstable syndesmosis at the time of injury.  She has well-controlled diabetes, but she does have neuropathy.  She has an existing area of skin necrosis about the size of a nickel on the dorsum of the foot and the medial first metatarsal head that were caused by the initial splint from the emergency room.  They are both stable, there not loose, there is no signs of infection.  She does not have any fracture blisters, her swelling is under control.  We discussed operative and nonoperative treatment.  This is a highly unstable fracture and I felt cast treatment would result in a very poor outcome.  We also discussed the fact that with operative and  non-operative treatment there is a 50% risk of complications given all of the medical problems in this setting.  At the time of surgery I was able to get very good fixation of the fractures in the syndesmosis.)))     She does have history DVT. She has been on Xarelto for about 3 months. She will resume Xarelto tomorrow.    Patient was provided pain medications as needed for pain control, along with popliteal nerve block infusion of Ropivacaine.    Adjustments were made to pain medications to optimize postop pain management. Risks and benefits of opiate medications discussed with patient.    She was seen by PT has progressed well over her stay.  She used an IS for atelectasis prophylaxis and Lovenox along with mechanicals for DVT prophylaxis.  Home medications were resumed as appropriate, and labs were monitored and remained fairly stable.     With the progress she has made, she is ready for DC home today.    Keep splint clean and dry until follow up appointment with Dr. Saba.  She will have an On Q pump ( instructed on it during this admit)  Discussed with patient regarding plan and she shows understanding and agreement.         Procedures Performed  08/09/18  1:19 PM     Preoperative diagnosis: right trimalleolar ankle fracture with syndesmotic disruption     Postoperative diagnosis: Same     Anesthesia: Gen. with blocks for postop pain control     Surgeon: Ashely Saba M.D.     Assistant: soraya MOSS, she was present for the entire procedure including prepping, draping, retraction, closure, dressing.     Operative procedure: 1. ORIF right trimalleolar ankle fracture without fixation of posterior lip  2. ORIF syndesmotic disruption       Pertinent Test Results:    I reviewed the patient's new clinical results.     Results from last 7 days  Lab Units 08/10/18  0813 08/07/18  1201   WBC 10*3/mm3 7.05 4.88   HEMOGLOBIN g/dL 10.2* 10.9*   HEMATOCRIT % 32.6* 34.9   PLATELETS 10*3/mm3 197 209       Results from  "last 7 days  Lab Units 08/10/18  0813 18  1201   SODIUM mmol/L 138 141   POTASSIUM mmol/L 4.7 4.4   CHLORIDE mmol/L 108 107   CO2 mmol/L 24.0 25.0   BUN mg/dL 17 20   CREATININE mg/dL 0.96 0.93   CALCIUM mg/dL 9.3 9.5   GLUCOSE mg/dL 99 131*     Results for IVY SIMMONS (MRN [id number]) as of 8/10/2018 13:40   Ref. Range 2018 20:36 8/10/2018 08:13 8/10/2018 08:31 8/10/2018 11:10   Glucose Latest Ref Range: 70 - 130 mg/dL 148 (H) 99 103 183 (H)     I reviewed the patient's new imaging including images and reports.      Physical therapy: Patient declined initiating gait training with rolling knee walker until AM. Patient able to hop and t/f from bed to BSC and from BSC to chair, requiring only CGA for safety. Plan is d/c home with family. Will continue to progress mobility training as able.     Discharge Assessment:    Vital Signs  /53 (BP Location: Right arm, Patient Position: Lying)   Pulse 68   Temp 97.8 °F (36.6 °C) (Temporal Artery )   Resp 16   Ht 165.1 cm (65\")   Wt 64.4 kg (142 lb)   SpO2 96%   BMI 23.63 kg/m²   Temp (24hrs), Av.2 °F (36.8 °C), Min:97.5 °F (36.4 °C), Max:99.4 °F (37.4 °C)      General Appearance:    Alert, cooperative, in no acute distress   Lungs:     Clear to auscultation,respirations regular, even and                   unlabored    Heart:    Regular rhythm and normal rate, normal S1 and S2   Abdomen:     Normal bowel sounds, no masses, no organomegaly, soft        non-tender, non-distended, no guarding, no rebound                 tenderness   Extremities:   LLE splint CDI. Good cap refill toes. Nerve block present   Pulses:   Pulses palpable and equal bilaterally   Skin:   No bleeding, bruising or rash   Neurologic:   Cranial nerves 2 - 12 grossly intact, sensation intact       Discharge Disposition: Home    Discharge Medications     Discharge Medications      New Medications      Instructions Start Date   docusate sodium 100 MG capsule   100 mg, Oral, 2 Times " Daily      Ropivacaine HCl-NaCl 0.2-0.9 %  Commonly known as:  NAROPIN   6 mL/hr, Peripheral Nerve, Continuous         Changes to Medications      Instructions Start Date   rivaroxaban 20 MG tablet  Commonly known as:  XARELTO  What changed:  additional instructions   20 mg, Oral, Daily, Resume 8/11/18         Continue These Medications      Instructions Start Date   aspirin 325 MG tablet   aspirin 325 mg tablet   Daily      citalopram 20 MG tablet  Commonly known as:  CeleXA   citalopram 20 mg tablet   1 Daily      HYDROcodone-acetaminophen 7.5-325 MG per tablet  Commonly known as:  NORCO   1-2 tablets, Oral, Every 6 Hours PRN      metFORMIN 500 MG tablet  Commonly known as:  GLUCOPHAGE   500 mg, Oral, 2 Times Daily With Meals      ondansetron 4 MG tablet  Commonly known as:  ZOFRAN   4 mg, Oral, Every 6 Hours PRN      rosuvastatin 5 MG tablet  Commonly known as:  CRESTOR   5 mg, Oral, Daily      TOPAMAX 25 MG tablet  Generic drug:  topiramate   Topamax 25 mg tablet   Take 2 tablets every day by oral route.             Discharge Diet: Consistent carb diet    Activity at Discharge: NWMARTIN RLE    Follow-up Appointments  Dr. Saba per her orders      LOLIS Barriga  08/10/18  1:38 PM

## 2018-08-10 NOTE — PROGRESS NOTES
Discharge Planning Assessment  [facility]     Patient Name: Anaid Cao  MRN: [id number]  Today's Date: 8/10/2018    Admit Date: 8/9/2018          Discharge Needs Assessment     Row Name 08/10/18 1438       Living Environment    Lives With significant other    Current Living Arrangements home/apartment/condo    Primary Care Provided by self    Provides Primary Care For no one    Family Caregiver if Needed sibling(s)    Quality of Family Relationships unable to assess    Able to Return to Prior Arrangements yes       Resource/Environmental Concerns    Resource/Environmental Concerns none    Transportation Concerns car, none       Transition Planning    Patient/Family Anticipates Transition to home with family    Patient/Family Anticipated Services at Transition none    Transportation Anticipated family or friend will provide       Discharge Needs Assessment    Readmission Within the Last 30 Days no previous admission in last 30 days    Concerns to be Addressed denies needs/concerns at this time    Equipment Currently Used at Home none    Anticipated Changes Related to Illness inability to care for self    Equipment Needed After Discharge other (see comments)   knee walker            Discharge Plan     Row Name 08/10/18 0139       Plan    Plan Home with sister    Patient/Family in Agreement with Plan yes    Plan Comments Met with patient at bedside. She lives in a home in [facility] [facility] with her significant other. 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 She has the following DME in place if needed: wheelchair, shower chair, raised toilet seat, and rolling walker. She will need a knee walker at discharge but plans to borrow one from a friend. CM advised that she can rent one for $50 per month from a local DME company if she is unable to borrow her friend's. She plans to discharge home today to stay with her sister who  will assist her while she recovers. Denies any discharge planning needs or concerns at this time. Carmen Aparicio RN [phone]    Final Discharge Disposition Code 01 - home or self-care        Destination     No service coordination in this encounter.      Durable Medical Equipment     No service coordination in this encounter.      Dialysis/Infusion     No service coordination in this encounter.      Home Medical Care     No service coordination in this encounter.      Social Care     No service coordination in this encounter.        Expected Discharge Date and Time     Expected Discharge Date Expected Discharge Time    Aug 10, 2018               Demographic Summary     Row Name 08/10/18 1437       General Information    Arrived From operating room    Referral Source admission list    Reason for Consult discharge planning    Preferred Language English            Functional Status     Row Name 08/10/18 1437       Functional Status    Usual Activity Tolerance moderate    Current Activity Tolerance fair       Functional Status, IADL    Medications independent    Meal Preparation independent    Housekeeping independent    Laundry independent    Shopping independent       Employment/    Employment Status employed full time    Shift Worked first shift    Current or Previous Occupation service industry    Employment/ Comments 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           Abuse/Neglect    No documentation.           Legal    No documentation.           Substance Abuse    No documentation.           Patient Forms    No documentation.         Carmen Aparicio RN

## 2018-08-10 NOTE — PROGRESS NOTES
[facility]    Acute pain service Inpatient Progress Note    Patient Name: Anaid Cao  :  1940  MRN:  [id number]        Acute Pain  Service Inpatient Progress Note:    Analgesia:Excellent  Pain Score:0/10  LOC: alert and awake  Cardiac: VS stable  Side Effects:None  Catheter Site:clean, dry and dressing intact  Cath type: peripheral nerve cath(MOOG pump)  Infusion rate: 6ml/hr  Catheter Plan:Catheter to remain Insitu

## 2018-08-10 NOTE — THERAPY DISCHARGE NOTE
Acute Care - Physical Therapy Discharge Summary  [facility]       Patient Name: Anaid Cao  : 1940  MRN: [id number]    Today's Date: 8/10/2018  Onset of Illness/Injury or Date of Surgery: 18    Date of Referral to PT: 18  Referring Physician: MD Wandy      Admit Date: 2018      PT Recommendation and Plan    Visit Dx:    ICD-10-CM ICD-9-CM   1. Impaired functional mobility, balance, gait, and endurance Z74.09 V49.89   2. Type 2 diabetes mellitus without complication, without long-term current use of insulin (CMS/[facility]) E11.9 250.00   3. Closed fracture of right ankle, initial encounter S82.891A 824.8             Outcome Measures     Row Name 08/10/18 1326 18 1627          How much help from another person do you currently need...    Turning from your back to your side while in flat bed without using bedrails? 4  -MJ 4  -LR     Moving from lying on back to sitting on the side of a flat bed without bedrails? 4  -MJ 3  -LR     Moving to and from a bed to a chair (including a wheelchair)? 3  -MJ 3  -LR     Standing up from a chair using your arms (e.g., wheelchair, bedside chair)? 3  -MJ 3  -LR     Climbing 3-5 steps with a railing? 2  -MJ 2  -LR     To walk in hospital room? 3  -MJ 3  -LR     AM-PAC 6 Clicks Score 19  -MJ 18  -LR        Functional Assessment    Outcome Measure Options AM-PAC 6 Clicks Basic Mobility (PT)  -MJ AM-PAC 6 Clicks Basic Mobility (PT)  -LR       User Key  (r) = Recorded By, (t) = Taken By, (c) = Cosigned By    Initials Name Provider Type    LR Gabriela Jolley, PT Physical Therapist    Gilbert Shepherd, PT Physical Therapist                PT Charges     Row Name 08/10/18 1326             Time Calculation    Start Time 1326  -MJ      PT Received On 08/10/18  -      PT Goal Re-Cert Due Date 18  -         Time Calculation- PT    Total Timed Code Minutes- PT 14 minute(s)  -         Timed Charges    28632 - Gait Training Minutes  10  -MJ       49178 - PT Therapeutic Activity Minutes 4  -MJ        User Key  (r) = Recorded By, (t) = Taken By, (c) = Cosigned By    Initials Name Provider Type    Gilbert Shepherd, PT Physical Therapist        Therapy Suggested Charges     Code   Minutes Charges    72767 (CPT®) Hc Pt Neuromusc Re Education Ea 15 Min      85084 (CPT®) Hc Pt Ther Proc Ea 15 Min      62408 (CPT®) Hc Gait Training Ea 15 Min 10 1    13386 (CPT®) Hc Pt Therapeutic Act Ea 15 Min 4     98343 (CPT®) Hc Pt Manual Therapy Ea 15 Min      83917 (CPT®) Hc Pt Iontophoresis Ea 15 Min      55348 (CPT®) Hc Pt Elec Stim Ea-Per 15 Min      16237 (CPT®) Hc Pt Ultrasound Ea 15 Min      86638 (CPT®) Hc Pt Self Care/Mgmt/Train Ea 15 Min      Total  14 1                PT Rehab Goals     Row Name 08/10/18 1625 08/10/18 1326          Bed Mobility Goal 1 (PT)    Activity/Assistive Device (Bed Mobility Goal 1, PT) sit to supine/supine to sit  -MC sit to supine/supine to sit  -MJ     [place] Level/Cues Needed (Bed Mobility Goal 1, PT) conditional independence  -MC conditional independence  -MJ     Time Frame (Bed Mobility Goal 1, PT) long term goal (LTG);5 days  -MC long term goal (LTG);5 days  -MJ     Progress/Outcomes (Bed Mobility Goal 1, PT) goal partially met   achieved sit to supine  -MC goal partially met   achieved sit to supine  -MJ        Transfer Goal 1 (PT)    Activity/Assistive Device (Transfer Goal 1, PT) sit-to-stand/stand-to-sit;walker, rolling  -MC sit-to-stand/stand-to-sit;walker, rolling  -MJ     [place] Level/Cues Needed (Transfer Goal 1, PT) conditional independence  -MC conditional independence  -MJ     Time Frame (Transfer Goal 1, PT) long term goal (LTG);5 days  -MC long term goal (LTG);5 days  -MJ     Progress/Outcome (Transfer Goal 1, PT) goal not met;discharged from facility  -MC goal not met;discharged from facility  -MJ        Transfer Goal 2 (PT)    Activity/Assistive Device (Transfer Goal 2, PT) bed-to-chair/chair-to-bed;walker,  rolling  -MC bed-to-chair/chair-to-bed;walker, rolling  -MJ     [place] Level/Cues Needed (Transfer Goal 2, PT) conditional independence  -MC conditional independence  -MJ     Time Frame (Transfer Goal 2, PT) long term goal (LTG);5 days  -MC long term goal (LTG);5 days  -MJ     Progress/Outcome (Transfer Goal 2, PT) goal not met;discharged from facility  -MC goal not met;discharged from facility  -MJ        Gait Training Goal 1 (PT)    Activity/Assistive Device (Gait Training Goal 1, PT) gait (walking locomotion);other (see comments)   rolling knee walker  -MC gait (walking locomotion);other (see comments)   rolling knee walker  -MJ     [place] Level (Gait Training Goal 1, PT) conditional independence  -MC conditional independence  -MJ     Distance (Gait Goal 1, PT) 150 feet  - feet  -MJ     Time Frame (Gait Training Goal 1, PT) long term goal (LTG);5 days  -MC long term goal (LTG);5 days  -MJ     Progress/Outcome (Gait Training Goal 1, PT) goal not met;discharged from facility  - goal not met;discharged from facility  -MJ       User Key  (r) = Recorded By, (t) = Taken By, (c) = Cosigned By    Initials Name Provider Type Discipline    Patricia Franco, PT Physical Therapist PT    Gilbert Shepherd PT Physical Therapist PT              PT Discharge Summary  Anticipated Discharge Disposition (PT): home with assist  Reason for Discharge: Discharge from facility  Outcomes Achieved: Refer to plan of care for updates on goals achieved      Patricia Rahman, PT   8/10/2018

## 2018-08-10 NOTE — PLAN OF CARE
Problem: Patient Care Overview  Goal: Plan of Care Review  Outcome: Ongoing (interventions implemented as appropriate)   08/10/18 0442   Plan of Care Review   Progress improving   OTHER   Outcome Summary Pt increased gait distance to 90 feet with rolling knee scooter and CGA, maintaining NWB R LE throughout. Pt required less assist for all functional mobility. Pt is discharging home today with assist, made good progress toward goals during inpatient stay   Coping/Psychosocial   Plan of Care Reviewed With patient

## 2018-08-10 NOTE — PROGRESS NOTES
Orthopedic Foot/Ankle Progress Note    Subjective     Post-Operative Day: 1 Day Post-Op    Systemic or Specific Complaints: doing well s/p ORIF right ankle, did well with PT, good pain control  Objective     Vital signs in last 24 hours:  Temp:  [97.5 °F (36.4 °C)-99.4 °F (37.4 °C)] 97.8 °F (36.6 °C)  Heart Rate:  [62-76] 68  Resp:  [16-18] 16  BP: (110-155)/(53-98) 110/53    Neurovascular: Toes wiggle on right, pink, some decrease sense due to block   Wound: Splint dry         Data Review  Lab Results (last 24 hours)     Procedure Component Value Units Date/Time    POC Glucose Once [[id number]]  (Abnormal) Collected:  08/10/18 1110    Specimen:  Blood Updated:  08/10/18 1116     Glucose 183 (H) mg/dL     Narrative:       Confirmed by Repeat Meter: [id number] : [id number] Subhash German    Basic Metabolic Panel [[id number]]  (Abnormal) Collected:  08/10/18 0813    Specimen:  Blood Updated:  08/10/18 0913     Glucose 99 mg/dL      BUN 17 mg/dL      Creatinine 0.96 mg/dL      Sodium 138 mmol/L      Potassium 4.7 mmol/L      Chloride 108 mmol/L      CO2 24.0 mmol/L      Calcium 9.3 mg/dL      eGFR Non African Amer 56 (L) mL/min/1.73      BUN/Creatinine Ratio 17.7     Anion Gap 6.0 mmol/L     Narrative:       [employer] Guidelines    Stage     Description        GFR  1         Normal or High     90+  2         Mild decrease      60-89  3         Moderate decrease  30-59  4         Severe decrease    15-29  5         Kidney failure     <15    CBC (No Diff) [[id number]]  (Abnormal) Collected:  08/10/18 0813    Specimen:  Blood Updated:  08/10/18 0857     WBC 7.05 10*3/mm3      RBC 3.50 (L) 10*6/mm3      Hemoglobin 10.2 (L) g/dL      Hematocrit 32.6 (L) %      MCV 93.1 fL      MCH 29.1 pg      MCHC 31.3 (L) g/dL      RDW 13.7 %      RDW-SD 46.4 fl      MPV 10.4 fL      Platelets 197 10*3/mm3     POC Glucose Once [[id number]]  (Normal) Collected:  08/10/18 0831    Specimen:  Blood Updated:  08/10/18 0842      Glucose 103 mg/dL     Narrative:       Meter: [id number] : [id number] Subhash German    POC Glucose Once [[id number]]  (Abnormal) Collected:  08/09/18 2036    Specimen:  Blood Updated:  08/09/18 2047     Glucose 148 (H) mg/dL     Narrative:       Meter: [id number] : [id number] Dianelys Oneal    POC Glucose Once [[id number]]  (Abnormal) Collected:  08/09/18 1656    Specimen:  Blood Updated:  08/09/18 1657     Glucose 190 (H) mg/dL     Narrative:       Meter: [id number] : [id number] Alyce Galvan            Assessment/Plan     Status post- stable s/p ORIF right ankle fx . Ok to go home, ok to take Xarelto tomorrow.  See me in 2 weeks or sooner if any problems           Ashely Saba MD    Date: 8/10/2018  Time: 1:20 PM

## 2018-08-10 NOTE — DISCHARGE INSTRUCTIONS
.1. Do not put weight on operated foot, use crutches, wheelchair,  walker or knee walker  2. Elevate operated foot over heart  3. Keep the splint dry and intact- the ace bandage may be tightened or loosened, but do not remove the splint underneath the ace bandage.    4. Call the office if any problems: [phone]  5. Take the Zofran with the pain meds if you have nausea/vomiting  6. Take xarelto, start 8/11/18 to help prevent blood clots

## 2018-08-10 NOTE — THERAPY DISCHARGE NOTE
Acute Care - Physical Therapy Treatment Note/Discharge   [place]     Patient Name: Anaid Cao  : 1940  MRN: [id number]  Today's Date: 8/10/2018  Onset of Illness/Injury or Date of Surgery: 18  Date of Referral to PT: 18  Referring Physician: MD Wandy    Admit Date: 2018    Visit Dx:    ICD-10-CM ICD-9-CM   1. Impaired functional mobility, balance, gait, and endurance Z74.09 V49.89   2. Type 2 diabetes mellitus without complication, without long-term current use of insulin (CMS/HCC) E11.9 250.00   3. Closed fracture of right ankle, initial encounter S82.891A 824.8     Patient Active Problem List   Diagnosis   • Closed fracture of right ankle   • Type 2 diabetes mellitus without complication, without long-term current use of insulin (CMS/HCC)   • Chronic deep vein thrombosis (DVT) of distal vein of right lower extremity (CMS/HCC)   • Ankle fracture, bimalleolar, closed, right, initial encounter   • HLD (hyperlipidemia)   • CAD (coronary artery disease)   • S/P ORIF right trimalleolar ankle fracture and syndesmotic disruption   • Acute postoperative pain   • Acute blood loss anemia, mild, asymptomatic       Physical Therapy Education     Title: PT OT SLP Therapies (Done)     Topic: Physical Therapy (Done)     Point: Mobility training (Done)    Learning Progress Summary     Learner Status Readiness Method Response Comment Documented by    Patient Done Acceptance DANIKA ALONZO Reviewed NWB status, home safety, proper sequencing with knee scooter, benefits of mobility  08/10/18 1345     Done Acceptance DANIKA ALONZO,NR Educated on NWB status of R LE, correct t/f technique, and progression of POC.  18 1659    Family Done Acceptance DANIKA ALONZO,NR Educated on NWB status of R LE, correct t/f technique, and progression of POC. LR 18 1659          Point: Home exercise program (Done)    Learning Progress Summary     Learner Status Readiness Method Response Comment Documented by    Patient Done  Acceptance DANIKA ALONZO Reviewed NWB status, home safety, proper sequencing with knee scooter, benefits of mobility  08/10/18 1345     Done Acceptance DANIKA ALONZO,NR Educated on NWB status of R LE, correct t/f technique, and progression of POC.  08/09/18 1659    Family Done Acceptance EDANIKA,NR Educated on NWB status of R LE, correct t/f technique, and progression of POC.  08/09/18 1659          Point: Body mechanics (Done)    Learning Progress Summary     Learner Status Readiness Method Response Comment Documented by    Patient Done Acceptance DANIKA ALONZO Reviewed NWB status, home safety, proper sequencing with knee scooter, benefits of mobility  08/10/18 1345     Done Acceptance DANIKA ALONZO,NR Educated on NWB status of R LE, correct t/f technique, and progression of POC.  08/09/18 1659    Family Done Acceptance DANIKA ALONZO,NR Educated on NWB status of R LE, correct t/f technique, and progression of POC.  08/09/18 1659          Point: Precautions (Done)    Learning Progress Summary     Learner Status Readiness Method Response Comment Documented by    Patient Done Acceptance DANIKA ALONZO Reviewed NWB status, home safety, proper sequencing with knee scooter, benefits of mobility  08/10/18 1345     Done Acceptance DANIKA ALONZO,NR Educated on NWB status of R LE, correct t/f technique, and progression of POC.  08/09/18 1659    Family Done Acceptance DANIKA ALONZO,NR Educated on NWB status of R LE, correct t/f technique, and progression of POC.  08/09/18 1659                      User Key     Initials Effective Dates Name Provider Type Discipline     06/19/15 -  Gabriela Jolley, PT Physical Therapist PT     04/03/18 -  Gilbert Hernandez, PT Physical Therapist PT                    PT Rehab Goals     Row Name 08/10/18 1326             Bed Mobility Goal 1 (PT)    Activity/Assistive Device (Bed Mobility Goal 1, PT) sit to supine/supine to sit  -      [place] Level/Cues Needed (Bed Mobility Goal 1, PT) conditional independence  -       Time Frame (Bed Mobility Goal 1, PT) long term goal (LTG);5 days  -MJ      Progress/Outcomes (Bed Mobility Goal 1, PT) goal partially met   achieved sit to supine  -MJ         Transfer Goal 1 (PT)    Activity/Assistive Device (Transfer Goal 1, PT) sit-to-stand/stand-to-sit;walker, rolling  -MJ      [place] Level/Cues Needed (Transfer Goal 1, PT) conditional independence  -MJ      Time Frame (Transfer Goal 1, PT) long term goal (LTG);5 days  -MJ      Progress/Outcome (Transfer Goal 1, PT) goal not met;discharged from facility  -         Transfer Goal 2 (PT)    Activity/Assistive Device (Transfer Goal 2, PT) bed-to-chair/chair-to-bed;walker, rolling  -MJ      [place] Level/Cues Needed (Transfer Goal 2, PT) conditional independence  -MJ      Time Frame (Transfer Goal 2, PT) long term goal (LTG);5 days  -MJ      Progress/Outcome (Transfer Goal 2, PT) goal not met;discharged from facility  -         Gait Training Goal 1 (PT)    Activity/Assistive Device (Gait Training Goal 1, PT) gait (walking locomotion);other (see comments)   rolling knee walker  -MJ      [place] Level (Gait Training Goal 1, PT) conditional independence  -MJ      Distance (Gait Goal 1, PT) 150 feet  -MJ      Time Frame (Gait Training Goal 1, PT) long term goal (LTG);5 days  -MJ      Progress/Outcome (Gait Training Goal 1, PT) goal not met;discharged from facility  -        User Key  (r) = Recorded By, (t) = Taken By, (c) = Cosigned By    Initials Name Provider Type Discipline     Gilbert Hernandez, PT Physical Therapist PT        Therapy Treatment        Rehabilitation Treatment Summary     Row Name 08/10/18 1326             Treatment Time/Intention    Discipline physical therapist  -      Document Type therapy note (daily note);discharge treatment  -MJ      Subjective Information no complaints  -      Mode of Treatment physical therapy  -MJ      Patient/Family Observations Pt sitting UIC, on-q ball, IV heplocked, ace  bandage/splint to R foot.  -MJ      Care Plan Review patient/other agree to care plan  -MJ      Patient Effort good  -MJ      Existing Precautions/Restrictions fall;non-weight bearing;other (see comments)   NWB R LE; popliteal nerve catheter  -MJ      Recorded by [MJ] Gilbert Hernandez, PT 08/10/18 1344      Row Name 08/10/18 1326             Cognitive Assessment/Intervention- PT/OT    Orientation Status (Cognition) oriented x 4  -MJ      Follows Commands (Cognition) WFL  -MJ      Recorded by [MJ] Gilbert Hernandez, PT 08/10/18 1344      Row Name 08/10/18 1326             Mobility Assessment/Intervention    Extremity Weight-bearing Status right lower extremity  -MJ      Right Lower Extremity (Weight-bearing Status) non weight-bearing (NWB)  -MJ      Recorded by [MJ] Gilbert Hernandez, PT 08/10/18 1344      Row Name 08/10/18 1326             Bed Mobility Assessment/Treatment    Bed Mobility Assessment/Treatment sit-supine  -MJ      Supine-Sit [place] (Bed Mobility) not tested   Pt UIC  -MJ      Sit-Supine [place] (Bed Mobility) conditional independence;verbal cues  -MJ      Bed Mobility, Safety Issues decreased use of legs for bridging/pushing  -MJ      Assistive Device (Bed Mobility) bed rails;head of bed elevated  -MJ      Comment (Bed Mobility) Verbal cues for sequencing  -MJ      Recorded by [MJ] Gilbert Hernandez, PT 08/10/18 1344      Row Name 08/10/18 1326             Transfer Assessment/Treatment    Transfer Assessment/Treatment sit-stand transfer;stand-sit transfer  -MJ      Maintains Weight-bearing Status (Transfers) able to maintain  -MJ      Comment (Transfers) Verbal cues for correct hand placement, for NWB R LE and for placement of R LE on knee scooter  -MJ      Recorded by [MJ] Gilbert Hernandez, PT 08/10/18 1344      Row Name 08/10/18 1326             Sit-Stand Transfer    Sit-Stand [place] (Transfers) contact guard;verbal cues  -MJ      Assistive Device (Sit-Stand Transfers) walker, knee scooter  -MJ       Recorded by [MJ] Gilbert Hernandez, PT 08/10/18 1344      Row Name 08/10/18 1326             Stand-Sit Transfer    Stand-Sit [place] (Transfers) contact guard;verbal cues  -MJ      Assistive Device (Stand-Sit Transfers) walker, knee scooter  -MJ      Recorded by [MJ] Gilbert Hernandez, PT 08/10/18 1344      Row Name 08/10/18 1326             Gait/Stairs Assessment/Training    34274 - Gait Training Minutes  10  -MJ      [place] Level (Gait) contact guard;verbal cues  -MJ2      Assistive Device (Gait) walker, knee scooter  -MJ2      Distance in Feet (Gait) 90  -MJ2      Comment (Gait/Stairs) Pt performed mobility with rolling knee scooter. Cues for upright posture and sequencing of turns. Gait limited by fatigue  -MJ2      Recorded by [MJ] Gilbert Hernandez, PT 08/10/18 1349  [MJ2] Gilbert Hernandez, PT 08/10/18 1344      Row Name 08/10/18 1326             Therapeutic Exercise    26029 - PT Therapeutic Activity Minutes 4  -MJ      Recorded by [MJ] Gilbert Hernandez, PT 08/10/18 1349      Row Name 08/10/18 1326             Positioning and Restraints    Pre-Treatment Position sitting in chair/recliner  -MJ      Post Treatment Position bed  -MJ      In Bed notified nsg;supine;call light within reach;encouraged to call for assist;RLE elevated  -MJ      Recorded by [MJ] Gilbert Hernandez, PT 08/10/18 1344      Row Name 08/10/18 1326             Pain Scale: Numbers Pre/Post-Treatment    Pain Scale: Numbers, Pretreatment 0/10 - no pain  -MJ      Pain Scale: Numbers, Post-Treatment 0/10 - no pain  -MJ      Recorded by [MJ] Gilbert Hernandez, PT 08/10/18 1344      Row Name                Wound 08/09/18 1120 Right other (see comments) foot other (see comments)    Wound - Properties Group Date first assessed: 08/09/18 [LD] Time first assessed: 1120 [LD] Present On Admission : yes [LD] Side: Right [LD] Orientation: other (see comments) [LD], top  Location: foot [LD] Type: other (see comments) [LD], pressure injury  Stage, Pressure Injury: deep tissue  injury;Stage 4 [LD] Recorded by:  [LD] Lisa Harmon RN 08/09/18 1153    Row Name                Wound 08/09/18 1120 Right toe pressure injury    Wound - Properties Group Date first assessed: 08/09/18 [LD] Time first assessed: 1120 [LD] Present On Admission : no [LD] Side: Right [LD] Location: toe [LD], great toe - medial  Type: pressure injury [LD] Stage, Pressure Injury: Stage 1 [LD] Recorded by:  [LD] Lisa Harmon RN 08/09/18 1155    Row Name                Wound 08/09/18 1316 Right ankle incision    Wound - Properties Group Date first assessed: 08/09/18 [LD] Time first assessed: 1316 [LD] Side: Right [LD] Location: ankle [LD] Type: incision [LD] Recorded by:  [LD] Lisa Harmon RN 08/09/18 1316    Row Name 08/10/18 1326             Plan of Care Review    Plan of Care Reviewed With patient  -MJ      Recorded by [MJ] Gilbert Hernandez, PT 08/10/18 1344      Row Name 08/10/18 1326             Outcome Summary/Treatment Plan (PT)    Daily Summary of Progress (PT) progress toward functional goals is good  -MJ      Recorded by [MJ] Gilbert Hernandez, PT 08/10/18 1344        User Key  (r) = Recorded By, (t) = Taken By, (c) = Cosigned By    Initials Name Effective Dates Discipline    LD Lisa Harmon RN 06/16/16 -  Nurse    Gilbert Shepherd, PT 04/03/18 -  PT        Wound 08/09/18 1316 Right ankle incision (Active)   Dressing Appearance dry;intact;no drainage 8/10/2018  8:20 AM   Closure CASSY 8/10/2018  8:20 AM   Drainage Amount none 8/10/2018  8:20 AM   Dressing Care, Wound elastic bandage 8/10/2018  8:20 AM       PT Recommendation and Plan     Outcome Summary/Treatment Plan (PT)  Daily Summary of Progress (PT): progress toward functional goals is good  Plan of Care Reviewed With: patient  Progress: improving  Outcome Summary: Pt increased gait distance to 90 feet with rolling knee scooter and CGA, maintaining NWB R LE throughout. Pt required less assist for all functional mobility. Pt is discharging home today  with assist, made good progress toward goals during inpatient stay          Outcome Measures     Row Name 08/10/18 1326 08/09/18 1627          How much help from another person do you currently need...    Turning from your back to your side while in flat bed without using bedrails? 4  -MJ 4  -LR     Moving from lying on back to sitting on the side of a flat bed without bedrails? 4  -MJ 3  -LR     Moving to and from a bed to a chair (including a wheelchair)? 3  -MJ 3  -LR     Standing up from a chair using your arms (e.g., wheelchair, bedside chair)? 3  -MJ 3  -LR     Climbing 3-5 steps with a railing? 2  -MJ 2  -LR     To walk in hospital room? 3  -MJ 3  -LR     AM-PAC 6 Clicks Score 19  -MJ 18  -LR        Functional Assessment    Outcome Measure Options AM-PAC 6 Clicks Basic Mobility (PT)  -MJ AM-PAC 6 Clicks Basic Mobility (PT)  -LR       User Key  (r) = Recorded By, (t) = Taken By, (c) = Cosigned By    Initials Name Provider Type    Gabriela Maya, PT Physical Therapist    Gilbert Shepherd, PT Physical Therapist           Time Calculation:         PT Charges     Row Name 08/10/18 1326             Time Calculation    Start Time 1326  -MJ      PT Received On 08/10/18  -      PT Goal Re-Cert Due Date 08/19/18  -         Time Calculation- PT    Total Timed Code Minutes- PT 14 minute(s)  -MJ         Timed Charges    62876 - Gait Training Minutes  10  -MJ      51002 - PT Therapeutic Activity Minutes 4  -MJ        User Key  (r) = Recorded By, (t) = Taken By, (c) = Cosigned By    Initials Name Provider Type    Gilbert Shepherd, PT Physical Therapist        Therapy Suggested Charges     Code   Minutes Charges    11413 (CPT®) Hc Pt Neuromusc Re Education Ea 15 Min      86852 (CPT®) Hc Pt Ther Proc Ea 15 Min      13131 (CPT®) Hc Gait Training Ea 15 Min 10 1    08554 (CPT®) Hc Pt Therapeutic Act Ea 15 Min 4     62294 (CPT®) Hc Pt Manual Therapy Ea 15 Min      08141 (CPT®) Hc Pt Iontophoresis Ea 15 Min      67887  (CPT®) Hc Pt Elec Stim Ea-Per 15 Min      25941 (CPT®) Hc Pt Ultrasound Ea 15 Min      96180 (CPT®) Hc Pt Self Care/Mgmt/Train Ea 15 Min      Total  14 1          Therapy Charges for Today     Code Description Service Date Service Provider Modifiers Qty    [id number] HC GAIT TRAINING EA 15 MIN 8/10/2018 Gilbert Hernandez, PT GP 1          PT G-Codes  Outcome Measure Options: AM-PAC 6 Clicks Basic Mobility (PT)         Gilbert Hernandez PT  8/10/2018

## 2018-08-10 NOTE — NURSING NOTE
Acute Pain Service:  No family present at this time. Peripheral nerve catheter and disposable infusion device teaching initiated with patient.   She will need Video demonstration once her family returns.  Handout and bracelet provided with [facility] on call central phone number.  Instructed to call with any questions or concerns.  Patient verbalized understanding.  Service will continue to follow until catheter DC'd.  Please contact patient at [phone]  if needed.

## 2018-08-11 NOTE — PROGRESS NOTES
ARUN Fisher    Nerve Cath Post Op Call    Patient Name: Anaid Cao  :  1940  MRN:  [id number]  Date of Discharge: 8/10/2018    Nerve Cath Post Op Call:    Analgesia:Excellent  Pain Score:4/10  Side Effects:None  Catheter Site:clean  Patient Controlled ON Q pump infusion rate: 14ml/hr  Catheter Plan:Will continue with plan at home without changes and The patient was instructed to call ON CALL Anesthesia provider for any questions or problems

## 2018-08-12 NOTE — PROGRESS NOTES
ARUN Fisher    Nerve Cath Post Op Call    Patient Name: Anaid Cao  :  1940  MRN:  [id number]  Date of Discharge: 8/10/2018    Nerve Cath Post Op Call:    Analgesia:Good  Pain Score:0/10  Side Effects:None  Catheter Site:clean  Patient Controlled ON Q pump infusion rate: 12ml/hr  Catheter Plan:Will continue with plan at home without changes and The patient was instructed to call ON CALL Anesthesia provider for any questions or problems

## 2018-08-13 ENCOUNTER — TELEPHONE (OUTPATIENT)
Dept: ORTHOPEDIC SURGERY | Facility: CLINIC | Age: 78
End: 2018-08-13

## 2018-08-13 NOTE — PROGRESS NOTES
ARUN Fsiher    Nerve Cath Post Op Call    Patient Name: Anaid Cao  :  1940  MRN:  [id number]  Date of Discharge: 8/10/2018    Nerve Cath Post Op Call:    Analgesia:Good  Pain Score:3/10  Side Effects:None  Catheter Site:clean  Patient Controlled ON Q pump infusion rate: 12ml/hr  Catheter Plan:Will continue with plan at home without changes and The patient was instructed to call ON CALL Anesthesia provider for any questions or problems

## 2018-08-13 NOTE — TELEPHONE ENCOUNTER
[facility] pharmacy called and stated that they will not fill Hydrocodone script for more than 28 pills. This is their new policy. I called patient and let her know, she is going to go back and get script and take it somewhere else.     Althea

## 2018-08-14 NOTE — PROGRESS NOTES
ARUN Fisher    Nerve Cath Post Op Call    Patient Name: Anaid Cao  :  1940  MRN:  [id number]  Date of Discharge: 8/10/2018    Nerve Cath Post Op Call:    Analgesia:Good  Catheter Plan:Patient/Family member report nerve catheter previously discontinued, tip intact

## 2018-08-20 ENCOUNTER — TELEPHONE (OUTPATIENT)
Dept: ORTHOPEDIC SURGERY | Facility: CLINIC | Age: 78
End: 2018-08-20

## 2018-08-24 ENCOUNTER — OFFICE VISIT (OUTPATIENT)
Dept: ORTHOPEDIC SURGERY | Facility: CLINIC | Age: 78
End: 2018-08-24

## 2018-08-24 DIAGNOSIS — Z87.81 S/P ORIF (OPEN REDUCTION INTERNAL FIXATION) FRACTURE: Primary | ICD-10-CM

## 2018-08-24 DIAGNOSIS — Z98.890 S/P ORIF (OPEN REDUCTION INTERNAL FIXATION) FRACTURE: Primary | ICD-10-CM

## 2018-08-24 PROCEDURE — 99024 POSTOP FOLLOW-UP VISIT: CPT | Performed by: ORTHOPAEDIC SURGERY

## 2018-08-24 NOTE — PROGRESS NOTES
Post-op (2 weeks s/p 1. ORIF right trimalleolar ankle fracture without fixation of posterior lip  2. ORIF syndesmotic disruption 8/9/18)      Anaid Cao is 2 weeks status post ORIF right trimalleolar fracture with syndesmotic repair, 8/9/18. She reports no fever, chills.  She reports pain is well controlled.  They have been taking lovenox for DVT prophylaxis.  They have been NWB in splint.      Past Surgical History:   Procedure Laterality Date   • ANKLE OPEN REDUCTION INTERNAL FIXATION Right 08/09/2018    Rt. Ankle ORIF - Dr. Ashely Saba; Orthopedic Surgery    • ANKLE OPEN REDUCTION INTERNAL FIXATION Right 8/9/2018    Procedure: ORIF right ankle fracture, syndesmosis repair;  Surgeon: Ashely Saba MD;  Location: [facility];  Service: Orthopedics   • CORONARY ARTERY BYPASS GRAFT         There were no vitals taken for this visit.        No erythema, no drainage, no sign of infection, normal post op swelling.  Incisions are healing, not ready to remove all sutures from the right ankle    ordered and reviewed x-rays today    Assessment and Plan:   1. S/P ORIF (open reduction internal fixation) fracture  Doing well, not yet ready to remove all the sutures.  We removed every other 1 and placed her in a short-leg fiberglass splint.  I will see her again in 7-14 days to possibly remove the remaining sutures.  No x-ray needed at that time unless she's having a problem.

## 2018-09-07 ENCOUNTER — OFFICE VISIT (OUTPATIENT)
Dept: ORTHOPEDIC SURGERY | Facility: CLINIC | Age: 78
End: 2018-09-07

## 2018-09-07 DIAGNOSIS — Z98.890 S/P ORIF (OPEN REDUCTION INTERNAL FIXATION) FRACTURE: Primary | ICD-10-CM

## 2018-09-07 DIAGNOSIS — Z87.81 S/P ORIF (OPEN REDUCTION INTERNAL FIXATION) FRACTURE: Primary | ICD-10-CM

## 2018-09-07 PROCEDURE — 29515 APPLICATION SHORT LEG SPLINT: CPT | Performed by: ORTHOPAEDIC SURGERY

## 2018-09-07 PROCEDURE — 99024 POSTOP FOLLOW-UP VISIT: CPT | Performed by: ORTHOPAEDIC SURGERY

## 2018-09-07 NOTE — PROGRESS NOTES
Post-op Follow-up (2 week follow up - 1 month status post ORIF right trimalleolar ankle fracture without fixation of posterior lip  2. ORIF syndesmotic disruption 8/9/18)      Anaid Cao is 4 weeks status post ORIF right trimall ankle fx with syndesmosis, 8/9/18. She reports no fever, chills.  She reports pain is well controlled.  They have been taking aspirin and xarelto for DVT prophylaxis.  They have been NWB in splint.      Past Surgical History:   Procedure Laterality Date   • ANKLE OPEN REDUCTION INTERNAL FIXATION Right 08/09/2018    Rt. Ankle ORIF - Dr. Ashely Saba; Orthopedic Surgery    • ANKLE OPEN REDUCTION INTERNAL FIXATION Right 8/9/2018    Procedure: ORIF right ankle fracture, syndesmosis repair;  Surgeon: Ashely Saba MD;  Location: [facility];  Service: Orthopedics   • CORONARY ARTERY BYPASS GRAFT         There were no vitals taken for this visit.        No erythema, no drainage, no sign of infection, normal post op swelling. Right ankle incisions healing, toes wiggle    ordered and reviewed x-rays today    Assessment and Plan:   1. S/P ORIF right trimalleolar ankle fracture and syndesmotic disruption  We removed remaining sutures, I want to watch the incisions carefully, and I think a splint will accommodate her swelling more than a cast at this point.  She was placed in a new short leg fiberglass splint.  I will see her in 2-3 weeks for non-wt bearing xray of the ankle.  Continue elevation, non-wt bearing, good glucose control

## 2018-09-28 ENCOUNTER — OFFICE VISIT (OUTPATIENT)
Dept: ORTHOPEDIC SURGERY | Facility: CLINIC | Age: 78
End: 2018-09-28

## 2018-09-28 DIAGNOSIS — Z98.890 S/P ORIF (OPEN REDUCTION INTERNAL FIXATION) FRACTURE: Primary | ICD-10-CM

## 2018-09-28 DIAGNOSIS — Z87.81 S/P ORIF (OPEN REDUCTION INTERNAL FIXATION) FRACTURE: Primary | ICD-10-CM

## 2018-09-28 PROCEDURE — 29405 APPL SHORT LEG CAST: CPT | Performed by: ORTHOPAEDIC SURGERY

## 2018-09-28 PROCEDURE — 99024 POSTOP FOLLOW-UP VISIT: CPT | Performed by: ORTHOPAEDIC SURGERY

## 2018-09-28 NOTE — PROGRESS NOTES
Post-op (3 week follow up - 7 weeks status post ORIF right trimalleolar ankle fracture without fixation of posterior lip  2. ORIF syndesmotic disruption 8/9/18)      Anaid Cao is 7 weeks status post ORIF right trimall fx with syndesmotis, 8/9/18. She reports no fever, chills.  She reports pain is well controlled.  They have been taking aspirin for DVT prophylaxis.  They have been NWB in splint.      Past Surgical History:   Procedure Laterality Date   • ANKLE OPEN REDUCTION INTERNAL FIXATION Right 08/09/2018    Rt. Ankle ORIF - Dr. Ashely Saba; Orthopedic Surgery    • ANKLE OPEN REDUCTION INTERNAL FIXATION Right 8/9/2018    Procedure: ORIF right ankle fracture, syndesmosis repair;  Surgeon: Ashely Saba MD;  Location: [facility];  Service: Orthopedics   • CORONARY ARTERY BYPASS GRAFT         There were no vitals taken for this visit.        No erythema, no drainage, no sign of infection, normal post op swelling. Right ankle has less swelling, incisions healing, n-v no change    ordered and reviewed x-rays today    Assessment and Plan:   1. S/P ORIF (open reduction internal fixation) fracture  Doing well, incisions healing and no sign of infection.  We placed her in a short leg fiberglass cast today, non-wt bearing.  She wants to go to work () and I DO NOT recommend this.  She must be non-wt bearing. We gave her an off work note.  She has had some groin pain on the right from lifting the leg with splint/cast, if it persists we can xray hip, but no pain with hip rotation on exam, likely muscular.  I will see her in 6 weeks for xray 3 views of ankle out of cast, if hip is still painful needs xray  - XR Foot 3+ View Right

## 2018-11-09 ENCOUNTER — OFFICE VISIT (OUTPATIENT)
Dept: ORTHOPEDIC SURGERY | Facility: CLINIC | Age: 78
End: 2018-11-09

## 2018-11-09 DIAGNOSIS — Z98.890 S/P ORIF (OPEN REDUCTION INTERNAL FIXATION) FRACTURE: Primary | ICD-10-CM

## 2018-11-09 DIAGNOSIS — Z87.81 S/P ORIF (OPEN REDUCTION INTERNAL FIXATION) FRACTURE: Primary | ICD-10-CM

## 2018-11-09 PROCEDURE — 99212 OFFICE O/P EST SF 10 MIN: CPT | Performed by: ORTHOPAEDIC SURGERY

## 2018-11-09 NOTE — PROGRESS NOTES
Follow-up of the Right Ankle (6 week f/u/3 months status post ORIF right trimalleolar ankle fracture without fixation of posterior lip  2. ORIF syndesmotic disruption 8/9/18)      Anaid Cao is 3 months status post ORIF right trimall fx with syndesmosis, 8/9/18. She reports no fever, chills.  She reports pain is well controlled.  They have been taking aspirin for DVT prophylaxis.  They have been NWB in cast.      Past Surgical History:   Procedure Laterality Date   • ANKLE OPEN REDUCTION INTERNAL FIXATION Right 08/09/2018    Rt. Ankle ORIF - Dr. Ashely Saba; Orthopedic Surgery    • ANKLE OPEN REDUCTION INTERNAL FIXATION Right 8/9/2018    Procedure: ORIF right ankle fracture, syndesmosis repair;  Surgeon: Ashely Saba MD;  Location: [facility];  Service: Orthopedics   • CORONARY ARTERY BYPASS GRAFT         There were no vitals taken for this visit.        No erythema, no drainage, no sign of infection, normal post op swelling. Right ankle, still some lateral scabs on incision, reasonable ROM    ordered and reviewed x-rays today    Assessment and Plan:   1. S/P ORIF (open reduction internal fixation) fracture  Doing well, she may walk in boot now, start PT.  Wear support stocking under boot.  She may take a shower but not soak the ankle until all scabs gone.  Keep dressing on lateral incision until all scabs gone.  I will see her in 6-8 weeks for xray.    - XR Ankle 3+ View Right

## 2018-12-18 ENCOUNTER — TELEPHONE (OUTPATIENT)
Dept: ORTHOPEDIC SURGERY | Facility: CLINIC | Age: 78
End: 2018-12-18

## 2018-12-18 NOTE — TELEPHONE ENCOUNTER
Ms. Cao called and was concerned that her wound from an ORIF in August is running.  She wasn't sure if she should come in and be seen before Friday.  I spoke with TAR and I put her on the schedule for Wed. 12/19/18 at 1:40.  Kaylin

## 2018-12-19 ENCOUNTER — OFFICE VISIT (OUTPATIENT)
Dept: ORTHOPEDIC SURGERY | Facility: CLINIC | Age: 78
End: 2018-12-19

## 2018-12-19 VITALS — BODY MASS INDEX: 22.49 KG/M2 | HEART RATE: 105 BPM | HEIGHT: 65 IN | WEIGHT: 135 LBS | OXYGEN SATURATION: 98 %

## 2018-12-19 DIAGNOSIS — Z98.890 S/P ORIF (OPEN REDUCTION INTERNAL FIXATION) FRACTURE: Primary | ICD-10-CM

## 2018-12-19 DIAGNOSIS — Z87.81 S/P ORIF (OPEN REDUCTION INTERNAL FIXATION) FRACTURE: Primary | ICD-10-CM

## 2018-12-19 DIAGNOSIS — T14.8XXA SKIN WOUND FROM SURGICAL INCISION: ICD-10-CM

## 2018-12-19 PROCEDURE — 99214 OFFICE O/P EST MOD 30 MIN: CPT | Performed by: PHYSICIAN ASSISTANT

## 2018-12-19 RX ORDER — CEPHALEXIN 500 MG/1
500 CAPSULE ORAL EVERY 6 HOURS
Qty: 40 CAPSULE | Refills: 0 | Status: SHIPPED | OUTPATIENT
Start: 2018-12-19

## 2018-12-19 NOTE — PROGRESS NOTES
[facility] [place] Note    Subjective     Patient: Anaid Cao  : 1940    Primary Care Provider: Geetha Cabrera MD    Requesting Provider: As above    Follow-up (Wound check, right Ankle ( ORIF fracture right ankle 18 ) )      History    Chief Complaint: Right incisional wound    History of Present Illness: Patient returns today with concerns of draining from her lateral incision from ORIF right ankle fracture with Dr. Jolly in 19.  She's been weightbearing and walking a boot until about 1 week ago.  She reports that she went to physical therapy they took her compression stocking off and there was drainage coming from her incision.  Since that time she has been keeping it covered but has gone back to a regular shoe and discontinued the boot secondary to rubbing over the incision.  She denies any fever or chills.  She is here for evaluation.    Current Outpatient Medications on File Prior to Visit   Medication Sig Dispense Refill   • aspirin 325 MG tablet aspirin 325 mg tablet   Daily     • citalopram (CeleXA) 20 MG tablet citalopram 20 mg tablet   1 Daily     • docusate sodium 100 MG capsule Take 100 mg by mouth 2 (Two) Times a Day. 60 capsule 0   • HYDROcodone-acetaminophen (NORCO) 7.5-325 MG per tablet Take 1-2 tablets by mouth Every 6 (Six) Hours As Needed for Moderate Pain . 60 tablet 0   • metFORMIN (GLUCOPHAGE) 500 MG tablet Take 500 mg by mouth 2 (Two) Times a Day With Meals.     • ondansetron (ZOFRAN) 4 MG tablet Take 1 tablet by mouth Every 6 (Six) Hours As Needed for Nausea or Vomiting. 30 tablet 0   • rivaroxaban (XARELTO) 20 MG tablet Take 1 tablet by mouth Daily. Resume 18 30 tablet    • Ropivacaine HCl-NaCl (NAROPIN) 0.2-0.9 % 12 mg/hr by Peripheral Nerve route Continuous.     • rosuvastatin (CRESTOR) 5 MG tablet Take 5 mg by mouth Daily.     • topiramate (TOPAMAX) 25 MG tablet Topamax 25 mg tablet   Take 2 tablets every day by oral route.       No  current facility-administered medications on file prior to visit.       Allergies   Allergen Reactions   • Sulfa Antibiotics Hives   • Levofloxacin Hives   • Oxycodone Itching      Past Medical History:   Diagnosis Date   • Ankle fracture    • Coronary artery disease    • Diabetes (CMS/HCC)     DIAGNOSED APPROX 5 YEARS AGO. TYPE 2 TESTS DAILY   • Lymphoma (CMS/HCC)    • Migraines      Past Surgical History:   Procedure Laterality Date   • ANKLE OPEN REDUCTION INTERNAL FIXATION Right 08/09/2018    Rt. Ankle ORIF - Dr. Ashely Saba; Orthopedic Surgery    • ANKLE OPEN REDUCTION INTERNAL FIXATION Right 8/9/2018    Procedure: ORIF right ankle fracture, syndesmosis repair;  Surgeon: Ashely Saba MD;  Location: [facility];  Service: Orthopedics   • CORONARY ARTERY BYPASS GRAFT       Family History   Problem Relation Age of Onset   • Cancer Mother    • Diabetes Mother       Social History     Socioeconomic History   • Marital status:      Spouse name: Not on file   • Number of children: Not on file   • Years of education: Not on file   • Highest education level: Not on file   Social Needs   • Financial resource strain: Not on file   • Food insecurity - worry: Not on file   • Food insecurity - inability: Not on file   • Transportation needs - medical: Not on file   • Transportation needs - non-medical: Not on file   Occupational History   • Not on file   Tobacco Use   • Smoking status: Never Smoker   • Smokeless tobacco: Never Used   Substance and Sexual Activity   • Alcohol use: No   • Drug use: No   • Sexual activity: Defer   Other Topics Concern   • Not on file   Social History Narrative   • Not on file        Review of Systems   Constitutional: Positive for activity change.   HENT: Negative.    Eyes: Negative.    Respiratory: Negative.    Cardiovascular: Negative.    Gastrointestinal: Negative.    Endocrine: Negative.    Genitourinary: Negative.    Musculoskeletal: Positive for arthralgias, gait problem and  "joint swelling.   Skin: Negative.    Allergic/Immunologic: Negative.    Hematological: Negative.    Psychiatric/Behavioral: Negative.        The following portions of the patient's history were reviewed and updated as appropriate: allergies, current medications, past family history, past medical history, past social history, past surgical history and problem list.      Objective      Physical Exam  Pulse 105   Ht 165.1 cm (65\")   Wt 61.2 kg (135 lb)   SpO2 98%   BMI 22.47 kg/m²     Body mass index is 22.47 kg/m².    Patient is well nourished and well developed.      Ortho Exam  Right ankle exam:  Lateral incision with 2 superficial wounds at the distal incision approximately 4 mm wide with no drainage, mild erythema.  1+ CELESTINA  Good ROM of the ankle with pulses 2+  NVI    Medical Decision Making    Data Review:   none    Assessment:  1. S/P ORIF (open reduction internal fixation) fracture    2. Skin wound from surgical incision        Plan:  Incisional wound s/p ORIF right ankle fracture 8/18 with Dr. Jolly.  The wounds are superficial with no sign of infection.  We told the patient that this is not unexpected given her skin, swelling and diabetes.  Plan today is that she begin doing wet to dry dressing changes daily.   I have given her a prescription for Keflex for 10 days.  She will return to see in one to two week for repeat exam or sooner if needed.      Carmen Cuevas PA-C  12/20/18  1:28 PM    "

## 2019-01-04 ENCOUNTER — OFFICE VISIT (OUTPATIENT)
Dept: ORTHOPEDIC SURGERY | Facility: CLINIC | Age: 79
End: 2019-01-04

## 2019-01-04 VITALS — BODY MASS INDEX: 22.48 KG/M2 | OXYGEN SATURATION: 99 % | HEART RATE: 105 BPM | HEIGHT: 65 IN | WEIGHT: 134.92 LBS

## 2019-01-04 DIAGNOSIS — Z98.890 S/P ORIF (OPEN REDUCTION INTERNAL FIXATION) FRACTURE: Primary | ICD-10-CM

## 2019-01-04 DIAGNOSIS — T14.8XXA SKIN WOUND FROM SURGICAL INCISION: ICD-10-CM

## 2019-01-04 DIAGNOSIS — Z87.81 S/P ORIF (OPEN REDUCTION INTERNAL FIXATION) FRACTURE: Primary | ICD-10-CM

## 2019-01-04 PROCEDURE — 99212 OFFICE O/P EST SF 10 MIN: CPT | Performed by: PHYSICIAN ASSISTANT

## 2019-01-04 RX ORDER — MUPIROCIN CALCIUM 20 MG/G
CREAM TOPICAL 3 TIMES DAILY
Qty: 30 G | Refills: 0 | Status: SHIPPED | OUTPATIENT
Start: 2019-01-04

## 2019-01-04 RX ORDER — ACETAMINOPHEN 325 MG/1
650 TABLET ORAL AS NEEDED
COMMUNITY

## 2019-01-04 NOTE — PROGRESS NOTES
[facility] [place] [facility] Note    Subjective     Patient: Anaid Cao  : 1940    Primary Care Provider: Geetha Cabrera MD    Requesting Provider: As above    Follow-up of the Right Ankle (2 week f/u/5 months post right Ankle ORIF fracture right ankle 18 )      History    Chief Complaint: f/up right incisional wound    History of Present Illness: Patient returns today for f/up her right incisional wound.  She has been keeping it dry.  It has signficantly improved.  No new symptoms.    Current Outpatient Medications on File Prior to Visit   Medication Sig Dispense Refill   • acetaminophen (TYLENOL) 325 MG tablet Take 650 mg by mouth As Needed for Mild Pain .     • aspirin 325 MG tablet aspirin 325 mg tablet   Daily     • cephalexin (KEFLEX) 500 MG capsule Take 1 capsule by mouth Every 6 (Six) Hours. 40 capsule 0   • citalopram (CeleXA) 20 MG tablet citalopram 20 mg tablet   1 Daily     • docusate sodium 100 MG capsule Take 100 mg by mouth 2 (Two) Times a Day. 60 capsule 0   • HYDROcodone-acetaminophen (NORCO) 7.5-325 MG per tablet Take 1-2 tablets by mouth Every 6 (Six) Hours As Needed for Moderate Pain . 60 tablet 0   • metFORMIN (GLUCOPHAGE) 500 MG tablet Take 500 mg by mouth 2 (Two) Times a Day With Meals.     • ondansetron (ZOFRAN) 4 MG tablet Take 1 tablet by mouth Every 6 (Six) Hours As Needed for Nausea or Vomiting. 30 tablet 0   • rivaroxaban (XARELTO) 20 MG tablet Take 1 tablet by mouth Daily. Resume 18 30 tablet    • Ropivacaine HCl-NaCl (NAROPIN) 0.2-0.9 % 12 mg/hr by Peripheral Nerve route Continuous.     • rosuvastatin (CRESTOR) 5 MG tablet Take 5 mg by mouth Daily.     • topiramate (TOPAMAX) 25 MG tablet Topamax 25 mg tablet   Take 2 tablets every day by oral route.       No current facility-administered medications on file prior to visit.       Allergies   Allergen Reactions   • Sulfa Antibiotics Hives   • Levofloxacin Hives   • Oxycodone Itching      Past Medical  History:   Diagnosis Date   • Ankle fracture    • Coronary artery disease    • Diabetes (CMS/HCC)     DIAGNOSED APPROX 5 YEARS AGO. TYPE 2 TESTS DAILY   • Lymphoma (CMS/HCC)    • Migraines      Past Surgical History:   Procedure Laterality Date   • ANKLE OPEN REDUCTION INTERNAL FIXATION Right 08/09/2018    Rt. Ankle ORIF - Dr. Ashely Saba; Orthopedic Surgery    • ANKLE OPEN REDUCTION INTERNAL FIXATION Right 8/9/2018    Procedure: ORIF right ankle fracture, syndesmosis repair;  Surgeon: Ashely Saba MD;  Location: [facility];  Service: Orthopedics   • CORONARY ARTERY BYPASS GRAFT       Family History   Problem Relation Age of Onset   • Cancer Mother    • Diabetes Mother       Social History     Socioeconomic History   • Marital status:      Spouse name: Not on file   • Number of children: Not on file   • Years of education: Not on file   • Highest education level: Not on file   Social Needs   • Financial resource strain: Not on file   • Food insecurity - worry: Not on file   • Food insecurity - inability: Not on file   • Transportation needs - medical: Not on file   • Transportation needs - non-medical: Not on file   Occupational History   • Not on file   Tobacco Use   • Smoking status: Never Smoker   • Smokeless tobacco: Never Used   Substance and Sexual Activity   • Alcohol use: No   • Drug use: No   • Sexual activity: Defer   Other Topics Concern   • Not on file   Social History Narrative   • Not on file        Review of Systems   Constitutional: Negative.    HENT: Negative.    Eyes: Negative.    Respiratory: Negative.    Cardiovascular: Negative.    Gastrointestinal: Negative.    Endocrine: Negative.    Genitourinary: Negative.    Musculoskeletal: Positive for arthralgias.   Skin: Negative.    Allergic/Immunologic: Negative.    Neurological: Negative.    Hematological: Negative.         Blood clot   Psychiatric/Behavioral: Negative.        The following portions of the patient's history were reviewed  "and updated as appropriate: allergies, current medications, past family history, past medical history, past social history, past surgical history and problem list.      Objective      Physical Exam  Pulse 105   Ht 165.1 cm (65\")   Wt 61.2 kg (134 lb 14.7 oz)   SpO2 99%   BMI 22.45 kg/m²     Body mass index is 22.45 kg/m².    Patient is well nourished and well developed.      Ortho Exam  Right ankle exam:  Lateral incision has 2 small eschars at the distal incision. Remainder of the incision is well healed.  Pulses 2+    Medical Decision Making    Data Review:   none    Assessment:  1. S/P ORIF (open reduction internal fixation) fracture    2. Skin wound from surgical incision        Plan:  Insicional wound healing well.  There are 2 abel areas of eschar on the insicion.  The remainder of the incision is well healed.  She will continue to keep the incision covered and use Bactroban on it until the scabs are gone.  RTC prn.      Carmen Cuevas PA-C  01/08/19  10:14 AM    "

## 2020-07-08 PROCEDURE — U0003 INFECTIOUS AGENT DETECTION BY NUCLEIC ACID (DNA OR RNA); SEVERE ACUTE RESPIRATORY SYNDROME CORONAVIRUS 2 (SARS-COV-2) (CORONAVIRUS DISEASE [COVID-19]), AMPLIFIED PROBE TECHNIQUE, MAKING USE OF HIGH THROUGHPUT TECHNOLOGIES AS DESCRIBED BY CMS-2020-01-R: HCPCS | Performed by: NURSE PRACTITIONER

## 2020-07-11 ENCOUNTER — TELEPHONE (OUTPATIENT)
Dept: URGENT CARE | Facility: CLINIC | Age: 80
End: 2020-07-11

## 2020-12-20 PROCEDURE — U0004 COV-19 TEST NON-CDC HGH THRU: HCPCS | Performed by: NURSE PRACTITIONER

## (undated) DEVICE — PK EXTREM LOWR 10

## (undated) DEVICE — SUT MONOCRYL PLS ANTIB UND 3/0  PS1 27IN

## (undated) DEVICE — 3M™ WARMING BLANKET, UPPER BODY, 10 PER CASE, 42268: Brand: BAIR HUGGER™

## (undated) DEVICE — SNAP KOVER: Brand: UNBRANDED

## (undated) DEVICE — SCRW CORT S/TAP 3.5X22MM
Type: IMPLANTABLE DEVICE | Site: ANKLE | Status: NON-FUNCTIONAL
Removed: 2018-08-09

## (undated) DEVICE — BIT DRL QC DIA 2.5X110MM

## (undated) DEVICE — UNDERCAST PADDING: Brand: DEROYAL

## (undated) DEVICE — NERVE BLOCK SUPPORT KIT/BLUE: Brand: MEDLINE INDUSTRIES, INC.

## (undated) DEVICE — INTENDED FOR TISSUE SEPARATION, AND OTHER PROCEDURES THAT REQUIRE A SHARP SURGICAL BLADE TO PUNCTURE OR CUT.: Brand: BARD-PARKER ® SAFETYLOCK CARBON RIB-BACK BLADES

## (undated) DEVICE — SPNG GZ WOVN 4X4IN 12PLY 10/BX STRL

## (undated) DEVICE — PAD ARMBRD SURG CONVOL 7.5X20X2IN

## (undated) DEVICE — DELFI MATCHING LIMB PROTECTION SLEEVES (MLPS) HELP PROTECT THE PATIENT’S LIMB FROM POSSIBLE WRINKLING, PINCHING AND SHEARING OF SKIN AND SOFT TISSUES OF THE LIMB.EACH DELFI MATCHING LIMB PROTECTION SLEEVE IS INTENDED FOR USE WITH A SPECIFIC DELFI TOURNIQUET CUFF. THIS SLEEVE IS SPECIFICALLY FOR THIGH.: Brand: MATCHING LIMB PROTECTION SLEEVES - VARI-FIT

## (undated) DEVICE — SUT MNCRYL 2/0 SH 27IN UD MCP417H

## (undated) DEVICE — 1010 S-DRAPE TOWEL DRAPE 10/BX: Brand: STERI-DRAPE™

## (undated) DEVICE — SPLNT ORTHOGLASS UNPAD P/C 4X38IN

## (undated) DEVICE — APPL CHLORAPREP W/TINT 26ML BLU

## (undated) DEVICE — INTENDED USE FOR SURGICAL MARKING ON INTACT SKIN, ALSO PROVIDES A PERMANENT METHOD OF IDENTIFYING OBJECTS IN THE OPERATING ROOM: Brand: WRITESITE® REGULAR TIP SKIN MARKER

## (undated) DEVICE — BIT DRL QC DIA 2.4X100MM NS

## (undated) DEVICE — GLV SURG SIGNATURE TOUCH PF LTX 7 STRL

## (undated) DEVICE — BNDG ELAS W/CLIP 6IN 10YD LF STRL

## (undated) DEVICE — GLV SURG RAD SENSICARE SHLD PF LF SZ8 STRL

## (undated) DEVICE — GW THRD 1.25X150MM FOR 3.5 4MM CANN SCRW

## (undated) DEVICE — ADHESIVE ISLAND DRESSING: Brand: TELFA

## (undated) DEVICE — SPNG GZ STRL 2S 4X4 12PLY

## (undated) DEVICE — UNDERGLV SURG BIOGEL INDICAT PF 61/2 GRN

## (undated) DEVICE — ST NERV BLCK CONT CONTIPLEX ECHO CLSD 18G 4IN

## (undated) DEVICE — BIT DRL QC DIA W/DEPTHMARK 1.8X110MM

## (undated) DEVICE — CANNULA,OXY,ADULT,SUPERSOFT,W/7'TUB,UC: Brand: MEDLINE

## (undated) DEVICE — GOWN,SLEEVE,STERILE,W/CSR WRAP,1/P: Brand: MEDLINE

## (undated) DEVICE — DRAPE,REIN 53X77,STERILE: Brand: MEDLINE

## (undated) DEVICE — DRSNG TELFA PAD NONADH STR 1S 3X8IN

## (undated) DEVICE — Device

## (undated) DEVICE — SPLNT ORTHOGLASS UNPAD P/C 4X24IN

## (undated) DEVICE — KT PUMP PAIN ONQ CBLOC SELCTAFLO 400ML

## (undated) DEVICE — SUCTION CANISTER, 2500CC, RIGID: Brand: DEROYAL

## (undated) DEVICE — CVR HNDL LT SURG ACCSSRY BLU STRL